# Patient Record
Sex: MALE
[De-identification: names, ages, dates, MRNs, and addresses within clinical notes are randomized per-mention and may not be internally consistent; named-entity substitution may affect disease eponyms.]

---

## 2019-06-05 ENCOUNTER — TRANSCRIPTION ENCOUNTER (OUTPATIENT)
Age: 49
End: 2019-06-05

## 2019-06-05 ENCOUNTER — INPATIENT (INPATIENT)
Facility: HOSPITAL | Age: 49
LOS: 2 days | Discharge: HOME | DRG: 493 | End: 2019-06-08
Attending: SURGERY | Admitting: SURGERY
Payer: COMMERCIAL

## 2019-06-05 VITALS — SYSTOLIC BLOOD PRESSURE: 167 MMHG | HEART RATE: 109 BPM | DIASTOLIC BLOOD PRESSURE: 97 MMHG
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
## 2019-06-05 LAB
ALBUMIN SERPL ELPH-MCNC: 4.5 G/DL — SIGNIFICANT CHANGE UP (ref 3.3–5.2)
ALP SERPL-CCNC: 76 U/L — SIGNIFICANT CHANGE UP (ref 40–120)
ALT FLD-CCNC: 60 U/L — HIGH
AMPHET UR-MCNC: NEGATIVE — SIGNIFICANT CHANGE UP
ANION GAP SERPL CALC-SCNC: 14 MMOL/L — SIGNIFICANT CHANGE UP (ref 5–17)
APPEARANCE UR: CLEAR — SIGNIFICANT CHANGE UP
APTT BLD: 26 SEC — LOW (ref 27.5–36.3)
AST SERPL-CCNC: 46 U/L — HIGH
BARBITURATES UR SCN-MCNC: NEGATIVE — SIGNIFICANT CHANGE UP
BASOPHILS # BLD AUTO: 0.1 K/UL — SIGNIFICANT CHANGE UP (ref 0–0.2)
BASOPHILS NFR BLD AUTO: 0.7 % — SIGNIFICANT CHANGE UP (ref 0–2)
BENZODIAZ UR-MCNC: NEGATIVE — SIGNIFICANT CHANGE UP
BILIRUB SERPL-MCNC: 0.5 MG/DL — SIGNIFICANT CHANGE UP (ref 0.4–2)
BILIRUB UR-MCNC: NEGATIVE — SIGNIFICANT CHANGE UP
BLD GP AB SCN SERPL QL: SIGNIFICANT CHANGE UP
BUN SERPL-MCNC: 14 MG/DL — SIGNIFICANT CHANGE UP (ref 8–20)
CALCIUM SERPL-MCNC: 9.7 MG/DL — SIGNIFICANT CHANGE UP (ref 8.6–10.2)
CHLORIDE SERPL-SCNC: 98 MMOL/L — SIGNIFICANT CHANGE UP (ref 98–107)
CO2 SERPL-SCNC: 25 MMOL/L — SIGNIFICANT CHANGE UP (ref 22–29)
COCAINE METAB.OTHER UR-MCNC: NEGATIVE — SIGNIFICANT CHANGE UP
COLOR SPEC: YELLOW — SIGNIFICANT CHANGE UP
CREAT SERPL-MCNC: 0.94 MG/DL — SIGNIFICANT CHANGE UP (ref 0.5–1.3)
DIFF PNL FLD: NEGATIVE — SIGNIFICANT CHANGE UP
EOSINOPHIL # BLD AUTO: 0.3 K/UL — SIGNIFICANT CHANGE UP (ref 0–0.5)
EOSINOPHIL NFR BLD AUTO: 3.7 % — SIGNIFICANT CHANGE UP (ref 0–5)
ETHANOL SERPL-MCNC: <10 MG/DL — SIGNIFICANT CHANGE UP
GLUCOSE SERPL-MCNC: 140 MG/DL — HIGH (ref 70–115)
GLUCOSE UR QL: NEGATIVE MG/DL — SIGNIFICANT CHANGE UP
HCT VFR BLD CALC: 45.1 % — SIGNIFICANT CHANGE UP (ref 42–52)
HGB BLD-MCNC: 15.5 G/DL — SIGNIFICANT CHANGE UP (ref 14–18)
INR BLD: 0.91 RATIO — SIGNIFICANT CHANGE UP (ref 0.88–1.16)
KETONES UR-MCNC: NEGATIVE — SIGNIFICANT CHANGE UP
LACTATE SERPL-SCNC: 0.9 MMOL/L — SIGNIFICANT CHANGE UP (ref 0.5–2)
LEUKOCYTE ESTERASE UR-ACNC: NEGATIVE — SIGNIFICANT CHANGE UP
LIDOCAIN IGE QN: 611 U/L — HIGH (ref 22–51)
LYMPHOCYTES # BLD AUTO: 2.4 K/UL — SIGNIFICANT CHANGE UP (ref 1–4.8)
LYMPHOCYTES # BLD AUTO: 30.1 % — SIGNIFICANT CHANGE UP (ref 20–55)
MCHC RBC-ENTMCNC: 31.5 PG — HIGH (ref 27–31)
MCHC RBC-ENTMCNC: 34.4 G/DL — SIGNIFICANT CHANGE UP (ref 32–36)
MCV RBC AUTO: 91.7 FL — SIGNIFICANT CHANGE UP (ref 80–94)
METHADONE UR-MCNC: NEGATIVE — SIGNIFICANT CHANGE UP
MONOCYTES # BLD AUTO: 0.5 K/UL — SIGNIFICANT CHANGE UP (ref 0–0.8)
MONOCYTES NFR BLD AUTO: 6.4 % — SIGNIFICANT CHANGE UP (ref 3–10)
NEUTROPHILS # BLD AUTO: 4.7 K/UL — SIGNIFICANT CHANGE UP (ref 1.8–8)
NEUTROPHILS NFR BLD AUTO: 58.4 % — SIGNIFICANT CHANGE UP (ref 37–73)
NITRITE UR-MCNC: NEGATIVE — SIGNIFICANT CHANGE UP
OPIATES UR-MCNC: NEGATIVE — SIGNIFICANT CHANGE UP
PCP SPEC-MCNC: SIGNIFICANT CHANGE UP
PCP UR-MCNC: NEGATIVE — SIGNIFICANT CHANGE UP
PH UR: 8 — SIGNIFICANT CHANGE UP (ref 5–8)
PLATELET # BLD AUTO: 268 K/UL — SIGNIFICANT CHANGE UP (ref 150–400)
POTASSIUM SERPL-MCNC: 4.3 MMOL/L — SIGNIFICANT CHANGE UP (ref 3.5–5.3)
POTASSIUM SERPL-SCNC: 4.3 MMOL/L — SIGNIFICANT CHANGE UP (ref 3.5–5.3)
PROT SERPL-MCNC: 7.8 G/DL — SIGNIFICANT CHANGE UP (ref 6.6–8.7)
PROT UR-MCNC: NEGATIVE MG/DL — SIGNIFICANT CHANGE UP
PROTHROM AB SERPL-ACNC: 10.4 SEC — SIGNIFICANT CHANGE UP (ref 10–12.9)
RBC # BLD: 4.92 M/UL — SIGNIFICANT CHANGE UP (ref 4.6–6.2)
RBC # FLD: 13.6 % — SIGNIFICANT CHANGE UP (ref 11–15.6)
SODIUM SERPL-SCNC: 137 MMOL/L — SIGNIFICANT CHANGE UP (ref 135–145)
SP GR SPEC: 1.01 — SIGNIFICANT CHANGE UP (ref 1.01–1.02)
THC UR QL: POSITIVE
TYPE + AB SCN PNL BLD: SIGNIFICANT CHANGE UP
UROBILINOGEN FLD QL: NEGATIVE MG/DL — SIGNIFICANT CHANGE UP
WBC # BLD: 8.1 K/UL — SIGNIFICANT CHANGE UP (ref 4.8–10.8)
WBC # FLD AUTO: 8.1 K/UL — SIGNIFICANT CHANGE UP (ref 4.8–10.8)

## 2019-06-05 PROCEDURE — 73610 X-RAY EXAM OF ANKLE: CPT | Mod: 26,RT

## 2019-06-05 PROCEDURE — 72170 X-RAY EXAM OF PELVIS: CPT | Mod: 26

## 2019-06-05 PROCEDURE — 73700 CT LOWER EXTREMITY W/O DYE: CPT | Mod: 26,RT

## 2019-06-05 PROCEDURE — 74177 CT ABD & PELVIS W/CONTRAST: CPT | Mod: 26

## 2019-06-05 PROCEDURE — 71260 CT THORAX DX C+: CPT | Mod: 26

## 2019-06-05 PROCEDURE — 71045 X-RAY EXAM CHEST 1 VIEW: CPT | Mod: 26

## 2019-06-05 PROCEDURE — 70450 CT HEAD/BRAIN W/O DYE: CPT | Mod: 26

## 2019-06-05 PROCEDURE — 73200 CT UPPER EXTREMITY W/O DYE: CPT | Mod: 26,LT

## 2019-06-05 PROCEDURE — 72125 CT NECK SPINE W/O DYE: CPT | Mod: 26

## 2019-06-05 PROCEDURE — 76377 3D RENDER W/INTRP POSTPROCES: CPT | Mod: 26

## 2019-06-05 PROCEDURE — 99291 CRITICAL CARE FIRST HOUR: CPT

## 2019-06-05 PROCEDURE — 99053 MED SERV 10PM-8AM 24 HR FAC: CPT

## 2019-06-05 PROCEDURE — 73090 X-RAY EXAM OF FOREARM: CPT | Mod: 26,LT

## 2019-06-05 PROCEDURE — 73130 X-RAY EXAM OF HAND: CPT | Mod: 26,LT

## 2019-06-05 PROCEDURE — 73060 X-RAY EXAM OF HUMERUS: CPT | Mod: 26,LT

## 2019-06-05 PROCEDURE — 73120 X-RAY EXAM OF HAND: CPT | Mod: 26,59,LT

## 2019-06-05 PROCEDURE — 73030 X-RAY EXAM OF SHOULDER: CPT | Mod: 26,LT

## 2019-06-05 PROCEDURE — 73110 X-RAY EXAM OF WRIST: CPT | Mod: 26,76,LT

## 2019-06-05 PROCEDURE — 73590 X-RAY EXAM OF LOWER LEG: CPT | Mod: 26,RT

## 2019-06-05 RX ORDER — HYDROMORPHONE HYDROCHLORIDE 2 MG/ML
0.5 INJECTION INTRAMUSCULAR; INTRAVENOUS; SUBCUTANEOUS EVERY 4 HOURS
Refills: 0 | Status: DISCONTINUED | OUTPATIENT
Start: 2019-06-05 | End: 2019-06-06

## 2019-06-05 RX ORDER — SODIUM CHLORIDE 9 MG/ML
1000 INJECTION, SOLUTION INTRAVENOUS ONCE
Refills: 0 | Status: COMPLETED | OUTPATIENT
Start: 2019-06-05 | End: 2019-06-05

## 2019-06-05 RX ORDER — HYDROMORPHONE HYDROCHLORIDE 2 MG/ML
1 INJECTION INTRAMUSCULAR; INTRAVENOUS; SUBCUTANEOUS ONCE
Refills: 0 | Status: DISCONTINUED | OUTPATIENT
Start: 2019-06-05 | End: 2019-06-05

## 2019-06-05 RX ORDER — HYDROMORPHONE HYDROCHLORIDE 2 MG/ML
0.5 INJECTION INTRAMUSCULAR; INTRAVENOUS; SUBCUTANEOUS ONCE
Refills: 0 | Status: DISCONTINUED | OUTPATIENT
Start: 2019-06-05 | End: 2019-06-05

## 2019-06-05 RX ORDER — HYDROMORPHONE HYDROCHLORIDE 2 MG/ML
1 INJECTION INTRAMUSCULAR; INTRAVENOUS; SUBCUTANEOUS EVERY 4 HOURS
Refills: 0 | Status: DISCONTINUED | OUTPATIENT
Start: 2019-06-05 | End: 2019-06-06

## 2019-06-05 RX ORDER — CEFAZOLIN SODIUM 1 G
2000 VIAL (EA) INJECTION ONCE
Refills: 0 | Status: COMPLETED | OUTPATIENT
Start: 2019-06-05 | End: 2019-06-05

## 2019-06-05 RX ORDER — CEFAZOLIN SODIUM 1 G
VIAL (EA) INJECTION
Refills: 0 | Status: DISCONTINUED | OUTPATIENT
Start: 2019-06-05 | End: 2019-06-06

## 2019-06-05 RX ORDER — ACETAMINOPHEN 500 MG
1000 TABLET ORAL ONCE
Refills: 0 | Status: COMPLETED | OUTPATIENT
Start: 2019-06-05 | End: 2019-06-05

## 2019-06-05 RX ORDER — SODIUM CHLORIDE 9 MG/ML
1000 INJECTION, SOLUTION INTRAVENOUS
Refills: 0 | Status: DISCONTINUED | OUTPATIENT
Start: 2019-06-05 | End: 2019-06-06

## 2019-06-05 RX ORDER — CEFAZOLIN SODIUM 1 G
2000 VIAL (EA) INJECTION EVERY 8 HOURS
Refills: 0 | Status: DISCONTINUED | OUTPATIENT
Start: 2019-06-05 | End: 2019-06-06

## 2019-06-05 RX ORDER — TETANUS TOXOID, REDUCED DIPHTHERIA TOXOID AND ACELLULAR PERTUSSIS VACCINE, ADSORBED 5; 2.5; 8; 8; 2.5 [IU]/.5ML; [IU]/.5ML; UG/.5ML; UG/.5ML; UG/.5ML
0.5 SUSPENSION INTRAMUSCULAR ONCE
Refills: 0 | Status: COMPLETED | OUTPATIENT
Start: 2019-06-05 | End: 2019-06-05

## 2019-06-05 RX ORDER — FENTANYL CITRATE 50 UG/ML
75 INJECTION INTRAVENOUS ONCE
Refills: 0 | Status: DISCONTINUED | OUTPATIENT
Start: 2019-06-05 | End: 2019-06-05

## 2019-06-05 RX ADMIN — Medication 100 MILLIGRAM(S): at 13:41

## 2019-06-05 RX ADMIN — Medication 100 MILLIGRAM(S): at 21:57

## 2019-06-05 RX ADMIN — TETANUS TOXOID, REDUCED DIPHTHERIA TOXOID AND ACELLULAR PERTUSSIS VACCINE, ADSORBED 0.5 MILLILITER(S): 5; 2.5; 8; 8; 2.5 SUSPENSION INTRAMUSCULAR at 08:30

## 2019-06-05 RX ADMIN — HYDROMORPHONE HYDROCHLORIDE 0.5 MILLIGRAM(S): 2 INJECTION INTRAMUSCULAR; INTRAVENOUS; SUBCUTANEOUS at 19:41

## 2019-06-05 RX ADMIN — FENTANYL CITRATE 75 MICROGRAM(S): 50 INJECTION INTRAVENOUS at 08:00

## 2019-06-05 RX ADMIN — HYDROMORPHONE HYDROCHLORIDE 1 MILLIGRAM(S): 2 INJECTION INTRAMUSCULAR; INTRAVENOUS; SUBCUTANEOUS at 10:07

## 2019-06-05 RX ADMIN — FENTANYL CITRATE 75 MICROGRAM(S): 50 INJECTION INTRAVENOUS at 07:33

## 2019-06-05 RX ADMIN — HYDROMORPHONE HYDROCHLORIDE 1 MILLIGRAM(S): 2 INJECTION INTRAMUSCULAR; INTRAVENOUS; SUBCUTANEOUS at 23:47

## 2019-06-05 RX ADMIN — HYDROMORPHONE HYDROCHLORIDE 0.5 MILLIGRAM(S): 2 INJECTION INTRAMUSCULAR; INTRAVENOUS; SUBCUTANEOUS at 15:30

## 2019-06-05 RX ADMIN — HYDROMORPHONE HYDROCHLORIDE 0.5 MILLIGRAM(S): 2 INJECTION INTRAMUSCULAR; INTRAVENOUS; SUBCUTANEOUS at 16:40

## 2019-06-05 RX ADMIN — Medication 1000 MILLIGRAM(S): at 12:46

## 2019-06-05 RX ADMIN — HYDROMORPHONE HYDROCHLORIDE 0.5 MILLIGRAM(S): 2 INJECTION INTRAMUSCULAR; INTRAVENOUS; SUBCUTANEOUS at 13:40

## 2019-06-05 RX ADMIN — HYDROMORPHONE HYDROCHLORIDE 1 MILLIGRAM(S): 2 INJECTION INTRAMUSCULAR; INTRAVENOUS; SUBCUTANEOUS at 08:41

## 2019-06-05 RX ADMIN — HYDROMORPHONE HYDROCHLORIDE 0.5 MILLIGRAM(S): 2 INJECTION INTRAMUSCULAR; INTRAVENOUS; SUBCUTANEOUS at 10:20

## 2019-06-05 RX ADMIN — SODIUM CHLORIDE 1000 MILLILITER(S): 9 INJECTION, SOLUTION INTRAVENOUS at 08:25

## 2019-06-05 RX ADMIN — SODIUM CHLORIDE 125 MILLILITER(S): 9 INJECTION, SOLUTION INTRAVENOUS at 21:58

## 2019-06-05 RX ADMIN — HYDROMORPHONE HYDROCHLORIDE 0.5 MILLIGRAM(S): 2 INJECTION INTRAMUSCULAR; INTRAVENOUS; SUBCUTANEOUS at 10:08

## 2019-06-05 RX ADMIN — HYDROMORPHONE HYDROCHLORIDE 0.5 MILLIGRAM(S): 2 INJECTION INTRAMUSCULAR; INTRAVENOUS; SUBCUTANEOUS at 19:20

## 2019-06-05 RX ADMIN — Medication 400 MILLIGRAM(S): at 10:20

## 2019-06-05 RX ADMIN — HYDROMORPHONE HYDROCHLORIDE 1 MILLIGRAM(S): 2 INJECTION INTRAMUSCULAR; INTRAVENOUS; SUBCUTANEOUS at 08:02

## 2019-06-05 RX ADMIN — SODIUM CHLORIDE 1000 MILLILITER(S): 9 INJECTION, SOLUTION INTRAVENOUS at 07:35

## 2019-06-05 RX ADMIN — HYDROMORPHONE HYDROCHLORIDE 1 MILLIGRAM(S): 2 INJECTION INTRAMUSCULAR; INTRAVENOUS; SUBCUTANEOUS at 08:40

## 2019-06-05 RX ADMIN — SODIUM CHLORIDE 125 MILLILITER(S): 9 INJECTION, SOLUTION INTRAVENOUS at 10:09
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
## 2019-06-05 NOTE — CHART NOTE - NSCHARTNOTEFT_GEN_A_CORE
Patient is cleared for surgery as per trauma team.  Ortho may take patient to surgery, and he is preopped.
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
## 2019-06-05 NOTE — ED PROVIDER NOTE - MUSCULOSKELETAL MINIMAL EXAM
Tenderness to lower cervical spine and midline spine; visible deformity to right ankle and left wrist
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
## 2019-06-05 NOTE — H&P ADULT - NSHPPHYSICALEXAM_GEN_ALL_CORE
Constitutional: Well-developed well nourished Male   HEENT: Head is normocephalic and atraumatic, maxillofacial structures stable, no blood or discharge from nares or oral cavity, no angel sign / racoon eyes, EOMI b/l, pupils [2 ]mm round and reactive to light b/l, no active drainage or redness  Neck: cervical collar in place, trachea midline  Respiratory: Breath sounds CTA b/l respirations are unlabored, no accessory muscle use, no conversational dyspnea  Cardiovascular: Regular rate & rhythm, +S1, S2  Chest: Chest wall is non-tender to palpation, no subQ emphysema or crepitus palpated  Gastrointestinal: Abdomen soft, non-tender, non-distended, no rebound tenderness / guarding, there is a superficial abrasion to the LUQ  Extremities: left wrist deformity and TTP, left middle finger laceration. sensory and motor fx intact to LUE with 2+ radial pulses palpated.  left mid thigh abrasion  LLE no TTP or deformities. sensory / motor fx intact. palpable 2+ DP and PT pulses  RUE pulses, sensory/motor fx intact. no deformities or TTP  R ankle deformity and TTP. sensory/motor fx intact. PT/DP pulses palpable 2+  Vascular: 2+ radial, femoral, and DP pulses b/l  Neurological: GCS: 15 (4/5/6). A&O x 3; no gross sensory / motor / coordination deficits  Musculoskeletal: 5/5 strength UE and LE  Back: + lower cervical spine TTP. no /T/LS spine tenderness to palpation, no step-offs or signs of external trauma to the back
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
## 2019-06-05 NOTE — PROGRESS NOTE ADULT - SUBJECTIVE AND OBJECTIVE BOX
All CTs are negative for acute traumatic injury, including cervical spine. Pt has no distracting injuries at this time, no changes in strength or sensation, clinically sober, and no pain with confrontational exam. Cervical collar removed. RN aware.
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
## 2019-06-05 NOTE — ED PROVIDER NOTE - CRITICAL CARE PROVIDED
direct patient care (not related to procedure)/additional history taking/interpretation of diagnostic studies/documentation/35 MINUTES/consultation with other physicians

## 2019-06-06 ENCOUNTER — TRANSCRIPTION ENCOUNTER (OUTPATIENT)
Age: 49
End: 2019-06-06

## 2019-06-06 LAB
ABO RH CONFIRMATION: SIGNIFICANT CHANGE UP
ANION GAP SERPL CALC-SCNC: 11 MMOL/L — SIGNIFICANT CHANGE UP (ref 5–17)
BUN SERPL-MCNC: 7 MG/DL — LOW (ref 8–20)
CALCIUM SERPL-MCNC: 8.6 MG/DL — SIGNIFICANT CHANGE UP (ref 8.6–10.2)
CHLORIDE SERPL-SCNC: 101 MMOL/L — SIGNIFICANT CHANGE UP (ref 98–107)
CO2 SERPL-SCNC: 25 MMOL/L — SIGNIFICANT CHANGE UP (ref 22–29)
CREAT SERPL-MCNC: 0.79 MG/DL — SIGNIFICANT CHANGE UP (ref 0.5–1.3)
GLUCOSE SERPL-MCNC: 114 MG/DL — SIGNIFICANT CHANGE UP (ref 70–115)
HCT VFR BLD CALC: 41.9 % — LOW (ref 42–52)
HGB BLD-MCNC: 14.1 G/DL — SIGNIFICANT CHANGE UP (ref 14–18)
INR BLD: 1.03 RATIO — SIGNIFICANT CHANGE UP (ref 0.88–1.16)
MAGNESIUM SERPL-MCNC: 1.9 MG/DL — SIGNIFICANT CHANGE UP (ref 1.6–2.6)
MCHC RBC-ENTMCNC: 31.1 PG — HIGH (ref 27–31)
MCHC RBC-ENTMCNC: 33.7 G/DL — SIGNIFICANT CHANGE UP (ref 32–36)
MCV RBC AUTO: 92.3 FL — SIGNIFICANT CHANGE UP (ref 80–94)
PHOSPHATE SERPL-MCNC: 3 MG/DL — SIGNIFICANT CHANGE UP (ref 2.4–4.7)
PLATELET # BLD AUTO: 210 K/UL — SIGNIFICANT CHANGE UP (ref 150–400)
POTASSIUM SERPL-MCNC: 3.8 MMOL/L — SIGNIFICANT CHANGE UP (ref 3.5–5.3)
POTASSIUM SERPL-SCNC: 3.8 MMOL/L — SIGNIFICANT CHANGE UP (ref 3.5–5.3)
PROTHROM AB SERPL-ACNC: 11.8 SEC — SIGNIFICANT CHANGE UP (ref 10–12.9)
RBC # BLD: 4.54 M/UL — LOW (ref 4.6–6.2)
RBC # FLD: 13.4 % — SIGNIFICANT CHANGE UP (ref 11–15.6)
SODIUM SERPL-SCNC: 137 MMOL/L — SIGNIFICANT CHANGE UP (ref 135–145)
WBC # BLD: 8.2 K/UL — SIGNIFICANT CHANGE UP (ref 4.8–10.8)
WBC # FLD AUTO: 8.2 K/UL — SIGNIFICANT CHANGE UP (ref 4.8–10.8)

## 2019-06-06 PROCEDURE — 11760 REPAIR OF NAIL BED: CPT | Mod: F2

## 2019-06-06 PROCEDURE — 11012 DEB SKIN BONE AT FX SITE: CPT | Mod: F2

## 2019-06-06 PROCEDURE — 26418 REPAIR FINGER TENDON: CPT | Mod: F2

## 2019-06-06 PROCEDURE — 27814 TREATMENT OF ANKLE FRACTURE: CPT | Mod: RT

## 2019-06-06 PROCEDURE — 76000 FLUOROSCOPY <1 HR PHYS/QHP: CPT | Mod: 26

## 2019-06-06 PROCEDURE — 64721 CARPAL TUNNEL SURGERY: CPT | Mod: LT

## 2019-06-06 PROCEDURE — 77071 MNL APPL STRS JT RADIOGRAPHY: CPT | Mod: RT

## 2019-06-06 PROCEDURE — 27610 EXPLORE/TREAT ANKLE JOINT: CPT | Mod: RT

## 2019-06-06 PROCEDURE — 99231 SBSQ HOSP IP/OBS SF/LOW 25: CPT

## 2019-06-06 PROCEDURE — 25609 OPTX DST RD XART FX/EP SEP3+: CPT | Mod: LT

## 2019-06-06 RX ORDER — ACETAMINOPHEN 500 MG
650 TABLET ORAL EVERY 6 HOURS
Refills: 0 | Status: DISCONTINUED | OUTPATIENT
Start: 2019-06-06 | End: 2019-06-08

## 2019-06-06 RX ORDER — POLYETHYLENE GLYCOL 3350 17 G/17G
17 POWDER, FOR SOLUTION ORAL DAILY
Refills: 0 | Status: DISCONTINUED | OUTPATIENT
Start: 2019-06-06 | End: 2019-06-08

## 2019-06-06 RX ORDER — SENNA PLUS 8.6 MG/1
2 TABLET ORAL AT BEDTIME
Refills: 0 | Status: DISCONTINUED | OUTPATIENT
Start: 2019-06-06 | End: 2019-06-08

## 2019-06-06 RX ORDER — FENTANYL CITRATE 50 UG/ML
50 INJECTION INTRAVENOUS
Refills: 0 | Status: DISCONTINUED | OUTPATIENT
Start: 2019-06-06 | End: 2019-06-06

## 2019-06-06 RX ORDER — ACETAMINOPHEN 500 MG
1000 TABLET ORAL ONCE
Refills: 0 | Status: DISCONTINUED | OUTPATIENT
Start: 2019-06-06 | End: 2019-06-07

## 2019-06-06 RX ORDER — HYDROMORPHONE HYDROCHLORIDE 2 MG/ML
0.5 INJECTION INTRAMUSCULAR; INTRAVENOUS; SUBCUTANEOUS EVERY 4 HOURS
Refills: 0 | Status: DISCONTINUED | OUTPATIENT
Start: 2019-06-06 | End: 2019-06-07

## 2019-06-06 RX ORDER — ONDANSETRON 8 MG/1
4 TABLET, FILM COATED ORAL ONCE
Refills: 0 | Status: DISCONTINUED | OUTPATIENT
Start: 2019-06-06 | End: 2019-06-06

## 2019-06-06 RX ORDER — SENNA PLUS 8.6 MG/1
1 TABLET ORAL AT BEDTIME
Refills: 0 | Status: DISCONTINUED | OUTPATIENT
Start: 2019-06-06 | End: 2019-06-08

## 2019-06-06 RX ORDER — DOCUSATE SODIUM 100 MG
100 CAPSULE ORAL
Refills: 0 | Status: DISCONTINUED | OUTPATIENT
Start: 2019-06-06 | End: 2019-06-07

## 2019-06-06 RX ORDER — OXYCODONE HYDROCHLORIDE 5 MG/1
10 TABLET ORAL EVERY 4 HOURS
Refills: 0 | Status: DISCONTINUED | OUTPATIENT
Start: 2019-06-06 | End: 2019-06-08

## 2019-06-06 RX ORDER — ONDANSETRON 8 MG/1
4 TABLET, FILM COATED ORAL EVERY 6 HOURS
Refills: 0 | Status: DISCONTINUED | OUTPATIENT
Start: 2019-06-06 | End: 2019-06-08

## 2019-06-06 RX ORDER — SODIUM CHLORIDE 9 MG/ML
1000 INJECTION, SOLUTION INTRAVENOUS
Refills: 0 | Status: DISCONTINUED | OUTPATIENT
Start: 2019-06-06 | End: 2019-06-06

## 2019-06-06 RX ORDER — HYDROMORPHONE HYDROCHLORIDE 2 MG/ML
1 INJECTION INTRAMUSCULAR; INTRAVENOUS; SUBCUTANEOUS EVERY 4 HOURS
Refills: 0 | Status: DISCONTINUED | OUTPATIENT
Start: 2019-06-06 | End: 2019-06-07

## 2019-06-06 RX ORDER — MAGNESIUM HYDROXIDE 400 MG/1
30 TABLET, CHEWABLE ORAL DAILY
Refills: 0 | Status: DISCONTINUED | OUTPATIENT
Start: 2019-06-06 | End: 2019-06-08

## 2019-06-06 RX ORDER — HYDROMORPHONE HYDROCHLORIDE 2 MG/ML
1 INJECTION INTRAMUSCULAR; INTRAVENOUS; SUBCUTANEOUS
Refills: 0 | Status: DISCONTINUED | OUTPATIENT
Start: 2019-06-06 | End: 2019-06-06

## 2019-06-06 RX ORDER — DOCUSATE SODIUM 100 MG
100 CAPSULE ORAL THREE TIMES A DAY
Refills: 0 | Status: DISCONTINUED | OUTPATIENT
Start: 2019-06-06 | End: 2019-06-08

## 2019-06-06 RX ORDER — OXYCODONE HYDROCHLORIDE 5 MG/1
5 TABLET ORAL EVERY 4 HOURS
Refills: 0 | Status: DISCONTINUED | OUTPATIENT
Start: 2019-06-06 | End: 2019-06-08

## 2019-06-06 RX ADMIN — HYDROMORPHONE HYDROCHLORIDE 1 MILLIGRAM(S): 2 INJECTION INTRAMUSCULAR; INTRAVENOUS; SUBCUTANEOUS at 20:46

## 2019-06-06 RX ADMIN — HYDROMORPHONE HYDROCHLORIDE 0.5 MILLIGRAM(S): 2 INJECTION INTRAMUSCULAR; INTRAVENOUS; SUBCUTANEOUS at 22:36

## 2019-06-06 RX ADMIN — HYDROMORPHONE HYDROCHLORIDE 0.5 MILLIGRAM(S): 2 INJECTION INTRAMUSCULAR; INTRAVENOUS; SUBCUTANEOUS at 22:22

## 2019-06-06 RX ADMIN — Medication 100 MILLIGRAM(S): at 06:32

## 2019-06-06 RX ADMIN — HYDROMORPHONE HYDROCHLORIDE 1 MILLIGRAM(S): 2 INJECTION INTRAMUSCULAR; INTRAVENOUS; SUBCUTANEOUS at 09:05

## 2019-06-06 RX ADMIN — HYDROMORPHONE HYDROCHLORIDE 1 MILLIGRAM(S): 2 INJECTION INTRAMUSCULAR; INTRAVENOUS; SUBCUTANEOUS at 18:19

## 2019-06-06 RX ADMIN — Medication 100 MILLIGRAM(S): at 22:21

## 2019-06-06 RX ADMIN — HYDROMORPHONE HYDROCHLORIDE 1 MILLIGRAM(S): 2 INJECTION INTRAMUSCULAR; INTRAVENOUS; SUBCUTANEOUS at 00:00

## 2019-06-06 RX ADMIN — HYDROMORPHONE HYDROCHLORIDE 1 MILLIGRAM(S): 2 INJECTION INTRAMUSCULAR; INTRAVENOUS; SUBCUTANEOUS at 08:35

## 2019-06-06 RX ADMIN — HYDROMORPHONE HYDROCHLORIDE 1 MILLIGRAM(S): 2 INJECTION INTRAMUSCULAR; INTRAVENOUS; SUBCUTANEOUS at 03:54

## 2019-06-06 RX ADMIN — HYDROMORPHONE HYDROCHLORIDE 1 MILLIGRAM(S): 2 INJECTION INTRAMUSCULAR; INTRAVENOUS; SUBCUTANEOUS at 04:05

## 2019-06-06 RX ADMIN — HYDROMORPHONE HYDROCHLORIDE 1 MILLIGRAM(S): 2 INJECTION INTRAMUSCULAR; INTRAVENOUS; SUBCUTANEOUS at 21:00

## 2019-06-06 RX ADMIN — HYDROMORPHONE HYDROCHLORIDE 1 MILLIGRAM(S): 2 INJECTION INTRAMUSCULAR; INTRAVENOUS; SUBCUTANEOUS at 18:29
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
## 2019-06-06 NOTE — PROGRESS NOTE ADULT - SUBJECTIVE AND OBJECTIVE BOX
ORTHO-POST-OP PROGRESS NOTE:      [id number]    ESPINOZA OWEN      PROCEDURE: Left wrist ORIF, Right ankle ORIF, I&D Left 3rd DIP, carpal tunnel release, tendon/nail bed repair  Surgeon: Dr. Sanchez/Dr. Avila  DOS: 6/6/19     Patient seen and examined. Patient doing well. Complaining of Left 4th and 5th finger numbness/tingling and generalized pain in left wrist. No other complaints. Tolerating PO fluids and diet well. He has voided postoperatively.                              14.1   8.2   )-----------( 210      ( 06 Jun 2019 06:31 )             41.9       I&O's Detail    05 Jun 2019 07:01  -  06 Jun 2019 07:00  --------------------------------------------------------  IN:  Total IN: 0 mL    OUT:    Voided: 2000 mL  Total OUT: 2000 mL    Total NET: -2000 mL      06 Jun 2019 07:01  -  06 Jun 2019 22:15  --------------------------------------------------------  IN:  Total IN: 0 mL    OUT:    Voided: 200 mL  Total OUT: 200 mL    Total NET: -200 mL    acetaminophen   Tablet .. 650 milliGRAM(s) Oral every 6 hours PRN  acetaminophen  IVPB .. 1000 milliGRAM(s) IV Intermittent once  aluminum hydroxide/magnesium hydroxide/simethicone Suspension 30 milliLiter(s) Oral four times a day PRN  clindamycin IVPB 600 milliGRAM(s) IV Intermittent every 8 hours  docusate sodium 100 milliGRAM(s) Oral two times a day  docusate sodium 100 milliGRAM(s) Oral three times a day  HYDROmorphone  Injectable 0.5 milliGRAM(s) IV Push every 4 hours PRN  HYDROmorphone  Injectable 0.5 milliGRAM(s) IV Push every 4 hours PRN  HYDROmorphone  Injectable 1 milliGRAM(s) IV Push every 4 hours PRN  magnesium hydroxide Suspension 30 milliLiter(s) Oral daily PRN  ondansetron Injectable 4 milliGRAM(s) IV Push every 6 hours PRN  oxyCODONE    IR 5 milliGRAM(s) Oral every 4 hours PRN  oxyCODONE    IR 10 milliGRAM(s) Oral every 4 hours PRN  polyethylene glycol 3350 17 Gram(s) Oral daily  senna 1 Tablet(s) Oral at bedtime  senna 2 Tablet(s) Oral at bedtime PRN      T(C): 37.2 (06-06-19 @ 19:44), Max: 38.1 (06-05-19 @ 22:29)  HR: 103 (06-06-19 @ 19:44) (96 - 111)  BP: 156/95 (06-06-19 @ 19:44) (130/101 - 182/101)  RR: 18 (06-06-19 @ 19:44) (15 - 20)  SpO2: 97% (06-06-19 @ 19:44) (96% - 100%)  Wt(kg): --      PHYSICAL EXAM:     Constitutional: Alert, responsive, in no acute distress.     Left upper Extremity: Extremity in elevation pillow, well padded volar splint in place with 3rd finger held in flexion. Sensation to 4th/5th finger slightly diminished distally. Fingers warm, cap refill less than 2 seconds. Splint loosened slightly and rewrapped and placed back in elevation pillow. FROM of all digits distally. Radial pulse palpable, 2+. BCR.     Right lower extremity: Well padded splint in place. Sensation to light touch grossly intact to skin that is exposed. Moving toes distally. Capillary refill is less than 2 seconds.     < from: Xray Ankle Post Reduction, Right (06.05.19 @ 12:20) >     EXAM:  XR ANKLE POST RED 2 VIEWS RT                          PROCEDURE DATE:  06/05/2019          INTERPRETATION:  RIGHT ANKLE X-RAY:    History: Post reduction.    Date and time of exam: 6/5/2019 11:58 AM.    Three views were obtained.    Findings: Again noted are fractures of the distal fibula and distal tibia   with widening of the ankle joint. A cast has been applied to the lower   extremity..    Impression:  Post reduction radiographs..    < end of copied text >    < from: Xray Hand Post Reduction, Left (06.05.19 @ 12:19) >   EXAM:  XR HAND POST RED 2 VIEWS LT                          PROCEDURE DATE:  06/05/2019          INTERPRETATION:  X-RAY LEFT HAND:    History: Post reduction left third finger fracture.    Date and time of exam: 6/5/2019 12:04 PM.    Technique: 3 views were obtained.    Findings: A splint has been applied to the left third finger. Again noted   is a comminuted oblique fracture involving the distal phalanx of the left   third finger. Alignment is now essentially anatomic. Alignment at the   distal interphalangeal joint is anatomic. No additional fractures noted   involving the hand. Again noted are fractures of the wrist..    Impression:  Post reduction radiographs of the left third finger..    < end of copied text >                                                            A/P :  48 y/o Male S/P Left wrist ORIF/Right ankle ORIF, I&D of 3rd DIP, left carpal tunnel release, and 3rd finger tendon/nail bed repair   POD# 0    -  Pain control  -  DVT ppx: As per primary team   -  Physical therapy  -  Weight bearing status: Non-weight bearing of Left upper extremity/ Non-weight bearing as Right lower extremity  -  Postop abx: clinda. Keflex to begin tomorrow for 10 days postoperatively  -  elevate RLE and LUE.  -  Discharge planning as per primary team  -

## 2019-06-06 NOTE — BRIEF OPERATIVE NOTE - NSICDXBRIEFPROCEDURE_GEN_ALL_CORE_FT
PROCEDURES:  Simple repair of superficial wound of hand, 2.5cm or less 06-Jun-2019 21:43:07 left middle finger Lis Avila  Repair of nail bed 06-Jun-2019 21:41:04 left middle finger Lis Avila  Repair of extensor tendon of single finger 06-Jun-2019 21:40:12 left middle finger Lis Avila  Debridement, fascia, muscle, or bone, for open fracture or dislocation 06-Jun-2019 21:39:06 left middle finger DIP joint Lis Avila  Carpal tunnel release 06-Jun-2019 21:33:45  Lis Avila  Open reduction and internal fixation of 3 or more fragments of distal radius 06-Jun-2019 21:32:31 left distal radius Lis Avila
PROCEDURES:  XR ankle, stress view 06-Jun-2019 14:32:54  Jeyson Sanchez  Arthrotomy of right ankle 06-Jun-2019 14:32:38  Jeyson Sanchez  ORIF, fracture, ankle, bimalleolar 06-Jun-2019 14:32:00  Jeyson Sanchez

## 2019-06-06 NOTE — PROGRESS NOTE ADULT - SUBJECTIVE AND OBJECTIVE BOX
INTERVAL HPI/OVERNIGHT EVENTS/SUBJECTIVE:  Patient doing well. Awaiting OR for ortho for write and ankle. No acute issues overnight. pain controlled. Cleared. for surgery.     Vital Signs Last 24 Hrs  T(C): 37.4 (2019 11:05), Max: 38.1 (2019 22:29)  T(F): 99.3 (2019 11:05), Max: 100.5 (2019 22:29)  HR: 106 (2019 11:05) (96 - 111)  BP: 171/92 (2019 11:05) (152/92 - 171/92)  BP(mean): --  ABP: --  ABP(mean): --  RR: 18 (2019 11:05) (18 - 20)  SpO2: 97% (2019 11:05) (96% - 98%)      I&O's Detail    2019 07:01  -  2019 07:00  --------------------------------------------------------  IN:  Total IN: 0 mL    OUT:    Voided: 2000 mL  Total OUT: 2000 mL    Total NET: -2000 mL                MEDICATIONS  (STANDING):  ceFAZolin   IVPB      ceFAZolin   IVPB 2000 milliGRAM(s) IV Intermittent every 8 hours  lactated ringers. 1000 milliLiter(s) (125 mL/Hr) IV Continuous <Continuous>    MEDICATIONS  (PRN):  HYDROmorphone  Injectable 0.5 milliGRAM(s) IV Push every 4 hours PRN Moderate Pain (4 - 6)  HYDROmorphone  Injectable 1 milliGRAM(s) IV Push every 4 hours PRN Severe Pain (7 - 10)      NUTRITION/IVF: NPO for OR    PHYSICAL EXAM:    Gen: well appearing male in and    Eyes: EOMI    Neurological: A&Ox3, no focal deficits    Pulmonary: unlabored    Gastrointestinal: soft nt nd    Genitourinary: voiding    Extremities: effected extremities are splinted (R ankle, L Distal radius). Pulses present    LABS:  CBC Full  -  ( 2019 06:31 )  WBC Count : 8.2 K/uL  RBC Count : 4.54 M/uL  Hemoglobin : 14.1 g/dL  Hematocrit : 41.9 %  Platelet Count - Automated : 210 K/uL  Mean Cell Volume : 92.3 fl  Mean Cell Hemoglobin : 31.1 pg  Mean Cell Hemoglobin Concentration : 33.7 g/dL  Auto Neutrophil # : x  Auto Lymphocyte # : x  Auto Monocyte # : x  Auto Eosinophil # : x  Auto Basophil # : x  Auto Neutrophil % : x  Auto Lymphocyte % : x  Auto Monocyte % : x  Auto Eosinophil % : x  Auto Basophil % : x    -    137  |  101  |  7.0<L>  ----------------------------<  114  3.8   |  25.0  |  0.79    Ca    8.6      2019 06:31  Phos  3.0     -  Mg     1.9     -    TPro  7.8  /  Alb  4.5  /  TBili  0.5  /  DBili  x   /  AST  46<H>  /  ALT  60<H>  /  AlkPhos  76  06-05    PT/INR - ( 2019 06:31 )   PT: 11.8 sec;   INR: 1.03 ratio         PTT - ( 2019 07:39 )  PTT:26.0 sec  Urinalysis Basic - ( 2019 14:40 )    Color: Yellow / Appearance: Clear / S.010 / pH: x  Gluc: x / Ketone: Negative  / Bili: Negative / Urobili: Negative mg/dL   Blood: x / Protein: Negative mg/dL / Nitrite: Negative   Leuk Esterase: Negative / RBC: x / WBC x   Sq Epi: x / Non Sq Epi: x / Bacteria: x      RECENT CULTURES:      LIVER FUNCTIONS - ( 2019 07:39 )  Alb: 4.5 g/dL / Pro: 7.8 g/dL / ALK PHOS: 76 U/L / ALT: 60 U/L / AST: 46 U/L / GGT: x               CAPILLARY BLOOD GLUCOSE      RADIOLOGY & ADDITIONAL STUDIES:    ASSESSMENT/PLAN:  49yMale presenting with R Bimalleolar fx, L Distal radius fx, L 3rd distal phalynx fx  - To OR with Ortho for wrist and ankle repair this morning  -post op will need PT/OT and rehab consults  -DVT proph  -adequate pain control post op  -spirometry  -diet post operatively  -dispo planning 24-48 hours post op.

## 2019-06-06 NOTE — BRIEF OPERATIVE NOTE - NSICDXBRIEFPOSTOP_GEN_ALL_CORE_FT
POST-OP DIAGNOSIS:  Laceration of extensor muscle, fascia, or tendon of left middle finger at wrist or hand level 06-Jun-2019 22:02:31  Lis Avila  Traumatic rupture of ligament of other finger at metacarpophalangeal and interphalangeal joint 06-Jun-2019 21:59:45 left middle Lis Avila  Laceration of middle finger with damage to nail 06-Jun-2019 21:59:01 left Lis Avila  Volar Arredondo's fracture of left radius 06-Jun-2019 21:58:19  Lis Avila
POST-OP DIAGNOSIS:  Bimalleolar fracture of right ankle 06-Jun-2019 14:33:22  Jeyson Sanchez

## 2019-06-06 NOTE — BRIEF OPERATIVE NOTE - NSICDXBRIEFPREOP_GEN_ALL_CORE_FT
PRE-OP DIAGNOSIS:  Laceration of middle finger with damage to nail 06-Jun-2019 21:53:25 left Lis Avila  Volar Arredondo's fracture of left radius 06-Jun-2019 21:51:00  Lis Avila
PRE-OP DIAGNOSIS:  Bimalleolar fracture of right ankle 06-Jun-2019 14:33:12  Jeyson Sanchez

## 2019-06-07 LAB
ANION GAP SERPL CALC-SCNC: 10 MMOL/L — SIGNIFICANT CHANGE UP (ref 5–17)
BASOPHILS # BLD AUTO: 0 K/UL — SIGNIFICANT CHANGE UP (ref 0–0.2)
BASOPHILS NFR BLD AUTO: 0.2 % — SIGNIFICANT CHANGE UP (ref 0–2)
BUN SERPL-MCNC: 10 MG/DL — SIGNIFICANT CHANGE UP (ref 8–20)
CALCIUM SERPL-MCNC: 8.5 MG/DL — LOW (ref 8.6–10.2)
CHLORIDE SERPL-SCNC: 100 MMOL/L — SIGNIFICANT CHANGE UP (ref 98–107)
CO2 SERPL-SCNC: 26 MMOL/L — SIGNIFICANT CHANGE UP (ref 22–29)
CREAT SERPL-MCNC: 0.8 MG/DL — SIGNIFICANT CHANGE UP (ref 0.5–1.3)
EOSINOPHIL # BLD AUTO: 0.1 K/UL — SIGNIFICANT CHANGE UP (ref 0–0.5)
EOSINOPHIL NFR BLD AUTO: 0.6 % — SIGNIFICANT CHANGE UP (ref 0–5)
GLUCOSE SERPL-MCNC: 112 MG/DL — SIGNIFICANT CHANGE UP (ref 70–115)
HCT VFR BLD CALC: 39.2 % — LOW (ref 42–52)
HGB BLD-MCNC: 12.9 G/DL — LOW (ref 14–18)
LYMPHOCYTES # BLD AUTO: 1.2 K/UL — SIGNIFICANT CHANGE UP (ref 1–4.8)
LYMPHOCYTES # BLD AUTO: 11.7 % — LOW (ref 20–55)
MAGNESIUM SERPL-MCNC: 2.1 MG/DL — SIGNIFICANT CHANGE UP (ref 1.6–2.6)
MCHC RBC-ENTMCNC: 30.6 PG — SIGNIFICANT CHANGE UP (ref 27–31)
MCHC RBC-ENTMCNC: 32.9 G/DL — SIGNIFICANT CHANGE UP (ref 32–36)
MCV RBC AUTO: 93.1 FL — SIGNIFICANT CHANGE UP (ref 80–94)
MONOCYTES # BLD AUTO: 1.3 K/UL — HIGH (ref 0–0.8)
MONOCYTES NFR BLD AUTO: 12.3 % — HIGH (ref 3–10)
NEUTROPHILS # BLD AUTO: 8 K/UL — SIGNIFICANT CHANGE UP (ref 1.8–8)
NEUTROPHILS NFR BLD AUTO: 74.9 % — HIGH (ref 37–73)
PHOSPHATE SERPL-MCNC: 3.1 MG/DL — SIGNIFICANT CHANGE UP (ref 2.4–4.7)
PLATELET # BLD AUTO: 225 K/UL — SIGNIFICANT CHANGE UP (ref 150–400)
POTASSIUM SERPL-MCNC: 3.8 MMOL/L — SIGNIFICANT CHANGE UP (ref 3.5–5.3)
POTASSIUM SERPL-SCNC: 3.8 MMOL/L — SIGNIFICANT CHANGE UP (ref 3.5–5.3)
RBC # BLD: 4.21 M/UL — LOW (ref 4.6–6.2)
RBC # FLD: 13.5 % — SIGNIFICANT CHANGE UP (ref 11–15.6)
SODIUM SERPL-SCNC: 136 MMOL/L — SIGNIFICANT CHANGE UP (ref 135–145)
WBC # BLD: 10.7 K/UL — SIGNIFICANT CHANGE UP (ref 4.8–10.8)
WBC # FLD AUTO: 10.7 K/UL — SIGNIFICANT CHANGE UP (ref 4.8–10.8)

## 2019-06-07 PROCEDURE — 99231 SBSQ HOSP IP/OBS SF/LOW 25: CPT

## 2019-06-07 RX ORDER — GABAPENTIN 400 MG/1
300 CAPSULE ORAL EVERY 8 HOURS
Refills: 0 | Status: DISCONTINUED | OUTPATIENT
Start: 2019-06-07 | End: 2019-06-08

## 2019-06-07 RX ORDER — GABAPENTIN 400 MG/1
1 CAPSULE ORAL
Qty: 60 | Refills: 0
Start: 2019-06-07

## 2019-06-07 RX ORDER — ACETAMINOPHEN 500 MG
1000 TABLET ORAL ONCE
Refills: 0 | Status: COMPLETED | OUTPATIENT
Start: 2019-06-07 | End: 2019-06-07

## 2019-06-07 RX ORDER — ACETAMINOPHEN 500 MG
2 TABLET ORAL
Qty: 0 | Refills: 0 | DISCHARGE
Start: 2019-06-07

## 2019-06-07 RX ORDER — CEPHALEXIN 500 MG
500 CAPSULE ORAL
Refills: 0 | Status: DISCONTINUED | OUTPATIENT
Start: 2019-06-07 | End: 2019-06-08

## 2019-06-07 RX ORDER — HEPARIN SODIUM 5000 [USP'U]/ML
5000 INJECTION INTRAVENOUS; SUBCUTANEOUS EVERY 8 HOURS
Refills: 0 | Status: DISCONTINUED | OUTPATIENT
Start: 2019-06-07 | End: 2019-06-08

## 2019-06-07 RX ORDER — KETOROLAC TROMETHAMINE 30 MG/ML
15 SYRINGE (ML) INJECTION ONCE
Refills: 0 | Status: DISCONTINUED | OUTPATIENT
Start: 2019-06-07 | End: 2019-06-07

## 2019-06-07 RX ADMIN — HYDROMORPHONE HYDROCHLORIDE 1 MILLIGRAM(S): 2 INJECTION INTRAMUSCULAR; INTRAVENOUS; SUBCUTANEOUS at 01:18

## 2019-06-07 RX ADMIN — Medication 500 MILLIGRAM(S): at 05:11

## 2019-06-07 RX ADMIN — HYDROMORPHONE HYDROCHLORIDE 1 MILLIGRAM(S): 2 INJECTION INTRAMUSCULAR; INTRAVENOUS; SUBCUTANEOUS at 05:25

## 2019-06-07 RX ADMIN — OXYCODONE HYDROCHLORIDE 10 MILLIGRAM(S): 5 TABLET ORAL at 13:51

## 2019-06-07 RX ADMIN — OXYCODONE HYDROCHLORIDE 10 MILLIGRAM(S): 5 TABLET ORAL at 23:45

## 2019-06-07 RX ADMIN — HEPARIN SODIUM 5000 UNIT(S): 5000 INJECTION INTRAVENOUS; SUBCUTANEOUS at 05:26

## 2019-06-07 RX ADMIN — Medication 100 MILLIGRAM(S): at 23:00

## 2019-06-07 RX ADMIN — HEPARIN SODIUM 5000 UNIT(S): 5000 INJECTION INTRAVENOUS; SUBCUTANEOUS at 15:09

## 2019-06-07 RX ADMIN — HYDROMORPHONE HYDROCHLORIDE 1 MILLIGRAM(S): 2 INJECTION INTRAMUSCULAR; INTRAVENOUS; SUBCUTANEOUS at 05:11

## 2019-06-07 RX ADMIN — HYDROMORPHONE HYDROCHLORIDE 0.5 MILLIGRAM(S): 2 INJECTION INTRAMUSCULAR; INTRAVENOUS; SUBCUTANEOUS at 02:34

## 2019-06-07 RX ADMIN — Medication 1000 MILLIGRAM(S): at 11:40

## 2019-06-07 RX ADMIN — OXYCODONE HYDROCHLORIDE 10 MILLIGRAM(S): 5 TABLET ORAL at 14:51

## 2019-06-07 RX ADMIN — GABAPENTIN 300 MILLIGRAM(S): 400 CAPSULE ORAL at 22:59

## 2019-06-07 RX ADMIN — Medication 100 MILLIGRAM(S): at 15:17

## 2019-06-07 RX ADMIN — Medication 100 MILLIGRAM(S): at 05:11

## 2019-06-07 RX ADMIN — HYDROMORPHONE HYDROCHLORIDE 0.5 MILLIGRAM(S): 2 INJECTION INTRAMUSCULAR; INTRAVENOUS; SUBCUTANEOUS at 07:23

## 2019-06-07 RX ADMIN — OXYCODONE HYDROCHLORIDE 10 MILLIGRAM(S): 5 TABLET ORAL at 08:18

## 2019-06-07 RX ADMIN — HYDROMORPHONE HYDROCHLORIDE 0.5 MILLIGRAM(S): 2 INJECTION INTRAMUSCULAR; INTRAVENOUS; SUBCUTANEOUS at 02:48

## 2019-06-07 RX ADMIN — OXYCODONE HYDROCHLORIDE 10 MILLIGRAM(S): 5 TABLET ORAL at 09:18

## 2019-06-07 RX ADMIN — Medication 100 MILLIGRAM(S): at 13:50

## 2019-06-07 RX ADMIN — Medication 15 MILLIGRAM(S): at 11:40

## 2019-06-07 RX ADMIN — Medication 100 MILLIGRAM(S): at 22:59

## 2019-06-07 RX ADMIN — HYDROMORPHONE HYDROCHLORIDE 0.5 MILLIGRAM(S): 2 INJECTION INTRAMUSCULAR; INTRAVENOUS; SUBCUTANEOUS at 07:30

## 2019-06-07 RX ADMIN — GABAPENTIN 300 MILLIGRAM(S): 400 CAPSULE ORAL at 13:51

## 2019-06-07 RX ADMIN — OXYCODONE HYDROCHLORIDE 10 MILLIGRAM(S): 5 TABLET ORAL at 18:52

## 2019-06-07 RX ADMIN — Medication 500 MILLIGRAM(S): at 23:00

## 2019-06-07 RX ADMIN — OXYCODONE HYDROCHLORIDE 10 MILLIGRAM(S): 5 TABLET ORAL at 22:58

## 2019-06-07 RX ADMIN — HEPARIN SODIUM 5000 UNIT(S): 5000 INJECTION INTRAVENOUS; SUBCUTANEOUS at 23:00

## 2019-06-07 RX ADMIN — Medication 500 MILLIGRAM(S): at 13:50

## 2019-06-07 RX ADMIN — HYDROMORPHONE HYDROCHLORIDE 1 MILLIGRAM(S): 2 INJECTION INTRAMUSCULAR; INTRAVENOUS; SUBCUTANEOUS at 01:04

## 2019-06-07 RX ADMIN — Medication 500 MILLIGRAM(S): at 18:01

## 2019-06-07 RX ADMIN — Medication 400 MILLIGRAM(S): at 11:17

## 2019-06-07 RX ADMIN — Medication 15 MILLIGRAM(S): at 11:17
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
## 2019-06-07 NOTE — PROGRESS NOTE ADULT - SUBJECTIVE AND OBJECTIVE BOX
Patient seen and examined at bedside. c/o mild pain RLE at operative site. Patient states has not taken PO meds yet for his pain, has been taking IV dilaudid. Denies fever/chills, SOB/chest pain, abdominal pain. Patient admits to paresthesia left phalanges. NO other complaints.    Vital Signs Last 24 Hrs  T(C): 36.9 (07 Jun 2019 07:05), Max: 37.4 (06 Jun 2019 11:05)  T(F): 98.4 (07 Jun 2019 07:05), Max: 99.3 (06 Jun 2019 11:05)  HR: 99 (07 Jun 2019 07:05) (99 - 111)  BP: 154/94 (07 Jun 2019 07:05) (130/101 - 182/101)  BP(mean): --  RR: 28 (07 Jun 2019 07:05) (15 - 28)  SpO2: 99% (07 Jun 2019 07:05) (96% - 100%)    LUE: Patient not currently using elevation pillow, extremity resting on his lap. Splint C/D/I. 3rd digit bandage C/D/I. + ROM phalanges, pt reports paresthesia to light touch to all phalanges. ext warm cap refill brisk.  RLE: Splint C/D/I. + EHL/FHL. SILT. Ext warm cap refill brisk. compartments soft throughout.                          12.9   10.7  )-----------( 225      ( 07 Jun 2019 07:41 )             39.2     A/P: 49 y.o M s/p left wrist ORIF, I&D left 3rd DIP, carpal tunnel release, tendon/nail bed repair, and right ankle ORIF POD #1  - patient placed in elevation pillow, educated patient on importance of elevating extremity  - pain control discussed with patient and primary team  - DVTP  - NWZACHERY SILVA and ISABEL   - [facility] care primary team

## 2019-06-07 NOTE — PHYSICAL THERAPY INITIAL EVALUATION ADULT - ADDITIONAL COMMENTS
pt lives in an apartment at his aunts house. he has 3-4 stairs to enter without rail. pt says he may be able to use the main entrance that has 3-4 stairs with bilat rails. pt owns no DME.

## 2019-06-07 NOTE — PHYSICAL THERAPY INITIAL EVALUATION ADULT - ASSISTIVE DEVICE FOR STAIR TRANSFER, REHAB EVAL
initially pt was set up to try hop to step with Right UE on rail and PT on left of pt, but pt tried jumping before PT could set up pt and determine if he was able to and pt had increase pain in Right LE and needed to sit down on steps. pt did not have a fall. pt was helped to lower down on to the step. pt showed scooting up and down steps with CGA if railing is present to get up from step. initially pt was set up to try hop to step with Right UE on rail and PT on left of pt, but pt tried jumping before PT could set up pt and determine if he was able to and pt had increase pain in Right LE and needed to sit down on steps. pt did not have a fall. pt was helped to lower down on to the step. pt showed and performed scooting up and down steps with CGA if railing is present to get up from step.

## 2019-06-07 NOTE — DISCHARGE NOTE PROVIDER - PROVIDER TOKENS
PROVIDER:[TOKEN:[[id number]]],PROVIDER:[TOKEN:[[id number]]] PROVIDER:[TOKEN:[[id number]],FOLLOWUP:[1 week]],PROVIDER:[TOKEN:[[id number]],FOLLOWUP:[1 week]]
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
## 2019-06-07 NOTE — DISCHARGE NOTE PROVIDER - CARE PROVIDERS DIRECT ADDRESSES
[email or web address]

## 2019-06-07 NOTE — DISCHARGE NOTE PROVIDER - CARE PROVIDER_API CALL
Jeyson Sanchez)  Orthopaedic Surgery  [address] [place] [address]  Phone: [phone]  Fax: [phone]  Follow Up Time:     Lis Avila)  Orthopedics  [address] [place] [address], [place] [address]  Phone: [phone]  Fax: [phone]  Follow Up Time: Jeyson Sanchez)  Orthopaedic Surgery  [address] [place] [address]  Phone: [phone]  Fax: [phone]  Follow Up Time: 1 week    Lis Avila)  Orthopedics  [address] [place] [address], [place] [address]  Phone: [phone]  Fax: [phone]  Follow Up Time: 1 week

## 2019-06-07 NOTE — DISCHARGE NOTE PROVIDER - NSDCFUADDINST_GEN_ALL_CORE_FT
Orthopedic discharge instructions: Please call to make appointment to be seen in office by Dr. Sanchez and Dr. Avila in 2 weeks. Patient is to take Keflex twice a day for a total of 10 days postoperatively. Patient is non-weight bearing of Left upper extremity and Right lower extremity. Maintain splints to Left upper extremity and Right lower extremity. Do not remove splints, do not get wet. Continue to elevate Left upper and Right lower extremity to reduce swelling.
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
## 2019-06-07 NOTE — PHYSICAL THERAPY INITIAL EVALUATION ADULT - GENERAL OBSERVATIONS, REHAB EVAL
Pt received in chair + Right LE in ace bandage and Left LE in splint and ace bandage. pt in NAD and agreeable to mobilize with PT.
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
## 2019-06-07 NOTE — PHYSICAL THERAPY INITIAL EVALUATION ADULT - GAIT DEVIATIONS NOTED, PT EVAL
decreased neli/decreased stride length/increased stride width/decreased weight-shifting ability decreased stride length/decreased weight-shifting ability/decreased neli

## 2019-06-07 NOTE — OCCUPATIONAL THERAPY INITIAL EVALUATION ADULT - ADDITIONAL COMMENTS
Pt states lives in an apartment in the aunts home; [address] with a rail and flat inside or can stay with aunt in main level which is [address] with ANA. rails and flat inside. Pt has shower chair and commode at home from uncle and will obtain cast sleeves for showering. Kang dressing discussed. Pts aunt can assist if need as per pt and pt was independent prior.

## 2019-06-08 ENCOUNTER — TRANSCRIPTION ENCOUNTER (OUTPATIENT)
Age: 49
End: 2019-06-08

## 2019-06-08 VITALS
DIASTOLIC BLOOD PRESSURE: 93 MMHG | HEART RATE: 93 BPM | RESPIRATION RATE: 18 BRPM | OXYGEN SATURATION: 98 % | TEMPERATURE: 98 F | SYSTOLIC BLOOD PRESSURE: 150 MMHG

## 2019-06-08 PROCEDURE — 71045 X-RAY EXAM CHEST 1 VIEW: CPT

## 2019-06-08 PROCEDURE — 36415 COLL VENOUS BLD VENIPUNCTURE: CPT

## 2019-06-08 PROCEDURE — G0390: CPT

## 2019-06-08 PROCEDURE — 99238 HOSP IP/OBS DSCHRG MGMT 30/<: CPT

## 2019-06-08 PROCEDURE — 90715 TDAP VACCINE 7 YRS/> IM: CPT

## 2019-06-08 PROCEDURE — 86901 BLOOD TYPING SEROLOGIC RH(D): CPT

## 2019-06-08 PROCEDURE — 80307 DRUG TEST PRSMV CHEM ANLYZR: CPT

## 2019-06-08 PROCEDURE — 83605 ASSAY OF LACTIC ACID: CPT

## 2019-06-08 PROCEDURE — 81003 URINALYSIS AUTO W/O SCOPE: CPT

## 2019-06-08 PROCEDURE — 84100 ASSAY OF PHOSPHORUS: CPT

## 2019-06-08 PROCEDURE — 85027 COMPLETE CBC AUTOMATED: CPT

## 2019-06-08 PROCEDURE — 86923 COMPATIBILITY TEST ELECTRIC: CPT

## 2019-06-08 PROCEDURE — 73700 CT LOWER EXTREMITY W/O DYE: CPT

## 2019-06-08 PROCEDURE — 80053 COMPREHEN METABOLIC PANEL: CPT

## 2019-06-08 PROCEDURE — 97116 GAIT TRAINING THERAPY: CPT

## 2019-06-08 PROCEDURE — 72170 X-RAY EXAM OF PELVIS: CPT

## 2019-06-08 PROCEDURE — 83690 ASSAY OF LIPASE: CPT

## 2019-06-08 PROCEDURE — 73590 X-RAY EXAM OF LOWER LEG: CPT

## 2019-06-08 PROCEDURE — 97167 OT EVAL HIGH COMPLEX 60 MIN: CPT

## 2019-06-08 PROCEDURE — C1713: CPT

## 2019-06-08 PROCEDURE — 76377 3D RENDER W/INTRP POSTPROCES: CPT

## 2019-06-08 PROCEDURE — 73060 X-RAY EXAM OF HUMERUS: CPT

## 2019-06-08 PROCEDURE — 72125 CT NECK SPINE W/O DYE: CPT

## 2019-06-08 PROCEDURE — 73600 X-RAY EXAM OF ANKLE: CPT

## 2019-06-08 PROCEDURE — 85610 PROTHROMBIN TIME: CPT

## 2019-06-08 PROCEDURE — 97163 PT EVAL HIGH COMPLEX 45 MIN: CPT

## 2019-06-08 PROCEDURE — 70450 CT HEAD/BRAIN W/O DYE: CPT

## 2019-06-08 PROCEDURE — 71260 CT THORAX DX C+: CPT

## 2019-06-08 PROCEDURE — 73610 X-RAY EXAM OF ANKLE: CPT

## 2019-06-08 PROCEDURE — 96374 THER/PROPH/DIAG INJ IV PUSH: CPT | Mod: XU

## 2019-06-08 PROCEDURE — 83735 ASSAY OF MAGNESIUM: CPT

## 2019-06-08 PROCEDURE — 74177 CT ABD & PELVIS W/CONTRAST: CPT

## 2019-06-08 PROCEDURE — 97530 THERAPEUTIC ACTIVITIES: CPT

## 2019-06-08 PROCEDURE — 85730 THROMBOPLASTIN TIME PARTIAL: CPT

## 2019-06-08 PROCEDURE — 97110 THERAPEUTIC EXERCISES: CPT

## 2019-06-08 PROCEDURE — 73200 CT UPPER EXTREMITY W/O DYE: CPT

## 2019-06-08 PROCEDURE — 73100 X-RAY EXAM OF WRIST: CPT

## 2019-06-08 PROCEDURE — 76000 FLUOROSCOPY <1 HR PHYS/QHP: CPT

## 2019-06-08 PROCEDURE — 99291 CRITICAL CARE FIRST HOUR: CPT | Mod: 25

## 2019-06-08 PROCEDURE — 73110 X-RAY EXAM OF WRIST: CPT

## 2019-06-08 PROCEDURE — 80048 BASIC METABOLIC PNL TOTAL CA: CPT

## 2019-06-08 PROCEDURE — 86850 RBC ANTIBODY SCREEN: CPT

## 2019-06-08 PROCEDURE — 73030 X-RAY EXAM OF SHOULDER: CPT

## 2019-06-08 PROCEDURE — 73090 X-RAY EXAM OF FOREARM: CPT

## 2019-06-08 PROCEDURE — 73610 X-RAY EXAM OF ANKLE: CPT | Mod: 26,RT

## 2019-06-08 PROCEDURE — 86900 BLOOD TYPING SEROLOGIC ABO: CPT

## 2019-06-08 PROCEDURE — 73120 X-RAY EXAM OF HAND: CPT

## 2019-06-08 PROCEDURE — 73130 X-RAY EXAM OF HAND: CPT

## 2019-06-08 RX ORDER — CEPHALEXIN 500 MG
1 CAPSULE ORAL
Qty: 36 | Refills: 0
Start: 2019-06-08 | End: 2019-06-16

## 2019-06-08 RX ORDER — DOCUSATE SODIUM 100 MG
1 CAPSULE ORAL
Qty: 30 | Refills: 0
Start: 2019-06-08 | End: 2019-07-07

## 2019-06-08 RX ORDER — CEPHALEXIN 500 MG
1 CAPSULE ORAL
Qty: 0 | Refills: 0 | DISCHARGE
Start: 2019-06-08

## 2019-06-08 RX ORDER — OXYCODONE HYDROCHLORIDE 5 MG/1
1 TABLET ORAL
Qty: 20 | Refills: 0
Start: 2019-06-08 | End: 2019-06-12

## 2019-06-08 RX ORDER — SENNA PLUS 8.6 MG/1
2 TABLET ORAL
Qty: 60 | Refills: 0
Start: 2019-06-08 | End: 2019-07-07

## 2019-06-08 RX ORDER — DOCUSATE SODIUM 100 MG
1 CAPSULE ORAL
Qty: 0 | Refills: 0 | DISCHARGE
Start: 2019-06-08

## 2019-06-08 RX ORDER — SENNA PLUS 8.6 MG/1
1 TABLET ORAL
Qty: 0 | Refills: 0 | DISCHARGE
Start: 2019-06-08

## 2019-06-08 RX ORDER — OXYCODONE HYDROCHLORIDE 5 MG/1
1 TABLET ORAL
Qty: 0 | Refills: 0 | DISCHARGE
Start: 2019-06-08

## 2019-06-08 RX ADMIN — HEPARIN SODIUM 5000 UNIT(S): 5000 INJECTION INTRAVENOUS; SUBCUTANEOUS at 05:43

## 2019-06-08 RX ADMIN — OXYCODONE HYDROCHLORIDE 10 MILLIGRAM(S): 5 TABLET ORAL at 09:03

## 2019-06-08 RX ADMIN — Medication 500 MILLIGRAM(S): at 12:27

## 2019-06-08 RX ADMIN — HEPARIN SODIUM 5000 UNIT(S): 5000 INJECTION INTRAVENOUS; SUBCUTANEOUS at 12:27

## 2019-06-08 RX ADMIN — Medication 100 MILLIGRAM(S): at 05:44

## 2019-06-08 RX ADMIN — OXYCODONE HYDROCHLORIDE 10 MILLIGRAM(S): 5 TABLET ORAL at 13:00

## 2019-06-08 RX ADMIN — OXYCODONE HYDROCHLORIDE 10 MILLIGRAM(S): 5 TABLET ORAL at 12:27

## 2019-06-08 RX ADMIN — OXYCODONE HYDROCHLORIDE 10 MILLIGRAM(S): 5 TABLET ORAL at 16:37

## 2019-06-08 RX ADMIN — Medication 500 MILLIGRAM(S): at 16:36

## 2019-06-08 RX ADMIN — OXYCODONE HYDROCHLORIDE 10 MILLIGRAM(S): 5 TABLET ORAL at 08:08

## 2019-06-08 RX ADMIN — OXYCODONE HYDROCHLORIDE 10 MILLIGRAM(S): 5 TABLET ORAL at 04:00

## 2019-06-08 RX ADMIN — Medication 100 MILLIGRAM(S): at 12:27

## 2019-06-08 RX ADMIN — GABAPENTIN 300 MILLIGRAM(S): 400 CAPSULE ORAL at 05:44

## 2019-06-08 RX ADMIN — Medication 500 MILLIGRAM(S): at 05:44

## 2019-06-08 RX ADMIN — OXYCODONE HYDROCHLORIDE 10 MILLIGRAM(S): 5 TABLET ORAL at 03:18

## 2019-06-08 RX ADMIN — GABAPENTIN 300 MILLIGRAM(S): 400 CAPSULE ORAL at 12:27

## 2019-06-08 NOTE — DISCHARGE NOTE NURSING/CASE MANAGEMENT/SOCIAL WORK - NSDCDPATPORTLINK_GEN_ALL_CORE
You can access the [facility] Portal, offered by [facility], by registering with the following website: [email or web address]

## 2019-06-08 NOTE — PROGRESS NOTE ADULT - SUBJECTIVE AND OBJECTIVE BOX
HPI/OVERNIGHT EVENTS:  No acute events overnight. Patient was evaluated at bedside and was found afebrile, HDS and in no acute distress. Patient states he is feeling better and wants to go home. Denies nausea, vomiting, fever, chest pain or SOB.    MEDICATIONS  (STANDING):  cephalexin 500 milliGRAM(s) Oral four times a day  clindamycin IVPB 600 milliGRAM(s) IV Intermittent every 8 hours  docusate sodium 100 milliGRAM(s) Oral three times a day  gabapentin 300 milliGRAM(s) Oral every 8 hours  heparin  Injectable 5000 Unit(s) SubCutaneous every 8 hours  polyethylene glycol 3350 17 Gram(s) Oral daily  senna 1 Tablet(s) Oral at bedtime    MEDICATIONS  (PRN):  acetaminophen   Tablet .. 650 milliGRAM(s) Oral every 6 hours PRN Temp greater or equal to 38C (100.4F), Mild Pain (1 - 3)  aluminum hydroxide/magnesium hydroxide/simethicone Suspension 30 milliLiter(s) Oral four times a day PRN Indigestion  bisacodyl Suppository 10 milliGRAM(s) Rectal daily PRN If no bowel movement by postoperative day #2  magnesium hydroxide Suspension 30 milliLiter(s) Oral daily PRN Constipation  ondansetron Injectable 4 milliGRAM(s) IV Push every 6 hours PRN Nausea and/or Vomiting  oxyCODONE    IR 5 milliGRAM(s) Oral every 4 hours PRN Moderate Pain (4 - 6)  oxyCODONE    IR 10 milliGRAM(s) Oral every 4 hours PRN Severe Pain (7 - 10)  senna 2 Tablet(s) Oral at bedtime PRN Constipation      Vital Signs Last 24 Hrs  T(C): 36.9 (08 Jun 2019 04:42), Max: 37.9 (07 Jun 2019 20:30)  T(F): 98.4 (08 Jun 2019 04:42), Max: 100.2 (07 Jun 2019 20:30)  HR: 98 (08 Jun 2019 04:42) (98 - 102)  BP: 140/88 (08 Jun 2019 04:42) (140/88 - 160/90)  BP(mean): --  RR: 18 (08 Jun 2019 04:42) (18 - 18)  SpO2: 95% (08 Jun 2019 04:42) (95% - 100%)    Constitutional: patient resting comfortably in bed, in no acute distress  HEENT: EOMI / PERRLA, MMM  Respiratory: non labored breathing.   Cardiovascular: RRR  Gastrointestinal: Abdomen soft, non-tender, non-distended, no rebound tenderness / guarding  Msk: LUE splinted, capillary refill 2 sec, mild numbness in first digit, rest of digit with intact sensation, all digitis w intact ROM, warm to touch. RLE spinted, capillary refill 2 sec, intact sensation in all digits, intact ROM in all digits, warm to touch        I&O's Detail    07 Jun 2019 07:01  -  08 Jun 2019 07:00  --------------------------------------------------------  IN:  Total IN: 0 mL    OUT:    Voided: 500 mL  Total OUT: 500 mL    Total NET: -500 mL          LABS:                        12.9   10.7  )-----------( 225      ( 07 Jun 2019 07:41 )             39.2     06-07    136  |  100  |  10.0  ----------------------------<  112  3.8   |  26.0  |  0.80    Ca    8.5<L>      07 Jun 2019 07:41  Phos  3.1     06-07  Mg     2.1     06-07

## 2019-06-08 NOTE — PROGRESS NOTE ADULT - PROVIDER SPECIALTY LIST ADULT
Anesthesia
Orthopedics
Trauma Surgery
Orthopedics
Trauma Surgery

## 2019-06-08 NOTE — PROGRESS NOTE ADULT - SUBJECTIVE AND OBJECTIVE BOX
Patient seen and examined s/p right ankle ORIF and left wrist ORIF, POD # 2. C/O  pain at operative sites consistent with surgery. participating with physical therapy Denies fever/chills, SOB/chest pain.  Reports no new complaints.    Vital Signs Last 24 Hrs  T(C): 36.9 (08 Jun 2019 07:08), Max: 37.9 (07 Jun 2019 20:30)  T(F): 98.5 (08 Jun 2019 07:08), Max: 100.2 (07 Jun 2019 20:30)  HR: 93 (08 Jun 2019 07:08) (93 - 102)  BP: 150/93 (08 Jun 2019 07:08) (140/88 - 160/90)  BP(mean): --  RR: 18 (08 Jun 2019 07:08) (18 - 18)  SpO2: 98% (08 Jun 2019 07:08) (95% - 100%)    PE: NAD, resting comfortably  LUE: extremity noted elevated in elevation pillow. Splint C/D/I. 3rd digit bandage C/D/I.   Gross motor intact: + ROM digits, digits warm, well perfused, cap refill brisk.. ext warm   +gross distal sensation intact to light .   RLE: Splint C/D/I   gross motor intact: able to wiggle toes, + EHL/FHL.   gross distal sensation intact  Ext warm cap refill brisk. compartments soft throughout.                                      12.9   10.7  )-----------( 225      ( 07 Jun 2019 07:41 )             39.2       A/P: 49 y.o M s/p left wrist ORIF, I&D left 3rd DIP, carpal tunnel release, tendon/nail bed repair, and right ankle ORIF POD #2    pain control   PO Keflex X 10 days  DVT PPX  maintain splints, keep clean and dry  upper and lower extremity elevation to help decrease swelling  PT/OT: LETICIA SILVA and ISABEL   Ortho stable, cont care primary team
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
## 2019-06-10 PROBLEM — Z78.9 OTHER SPECIFIED HEALTH STATUS: Chronic | Status: ACTIVE | Noted: 2019-06-05

## 2019-06-10 PROBLEM — Z00.00 ENCOUNTER FOR PREVENTIVE HEALTH EXAMINATION: Status: ACTIVE | Noted: 2019-06-10

## 2019-06-10 RX ORDER — OXYCODONE HYDROCHLORIDE 5 MG/1
1 TABLET ORAL
Qty: 20 | Refills: 0
Start: 2019-06-10 | End: 2019-06-14

## 2019-06-20 ENCOUNTER — APPOINTMENT (OUTPATIENT)
Dept: ORTHOPEDIC SURGERY | Facility: CLINIC | Age: 49
End: 2019-06-20
Payer: MEDICAID

## 2019-06-20 ENCOUNTER — OTHER (OUTPATIENT)
Age: 49
End: 2019-06-20

## 2019-06-20 VITALS
WEIGHT: 178 LBS | DIASTOLIC BLOOD PRESSURE: 103 MMHG | BODY MASS INDEX: 26.98 KG/M2 | HEART RATE: 90 BPM | SYSTOLIC BLOOD PRESSURE: 157 MMHG | HEIGHT: 68 IN

## 2019-06-20 PROCEDURE — 73110 X-RAY EXAM OF WRIST: CPT | Mod: LT

## 2019-06-20 PROCEDURE — 73140 X-RAY EXAM OF FINGER(S): CPT | Mod: F2

## 2019-06-20 PROCEDURE — 99024 POSTOP FOLLOW-UP VISIT: CPT

## 2019-06-20 PROCEDURE — 73610 X-RAY EXAM OF ANKLE: CPT | Mod: TC,RT

## 2019-06-20 RX ORDER — OXYCODONE 5 MG/1
5 TABLET ORAL EVERY 6 HOURS
Qty: 20 | Refills: 0 | Status: ACTIVE | COMMUNITY
Start: 2019-06-20 | End: 1900-01-01
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
## 2019-06-23 RX ORDER — CEPHALEXIN 750 MG/1
CAPSULE ORAL
Refills: 0 | Status: ACTIVE | COMMUNITY

## 2019-06-23 RX ORDER — DOCUSATE SODIUM 100 MG/1
CAPSULE ORAL
Refills: 0 | Status: ACTIVE | COMMUNITY

## 2019-06-27 ENCOUNTER — APPOINTMENT (OUTPATIENT)
Dept: ORTHOPEDIC SURGERY | Facility: CLINIC | Age: 49
End: 2019-06-27
Payer: MEDICAID

## 2019-06-27 VITALS
DIASTOLIC BLOOD PRESSURE: 94 MMHG | BODY MASS INDEX: 26.98 KG/M2 | HEIGHT: 68 IN | WEIGHT: 178 LBS | HEART RATE: 85 BPM | SYSTOLIC BLOOD PRESSURE: 155 MMHG

## 2019-06-27 PROCEDURE — 99024 POSTOP FOLLOW-UP VISIT: CPT

## 2019-06-27 RX ORDER — OXYCODONE 5 MG/1
5 TABLET ORAL EVERY 6 HOURS
Qty: 20 | Refills: 0 | Status: ACTIVE | COMMUNITY
Start: 2019-06-27 | End: 1900-01-01

## 2019-07-08 ENCOUNTER — APPOINTMENT (OUTPATIENT)
Dept: ORTHOPEDIC SURGERY | Facility: CLINIC | Age: 49
End: 2019-07-08
Payer: MEDICAID

## 2019-07-08 VITALS
WEIGHT: 178 LBS | HEIGHT: 68 IN | BODY MASS INDEX: 26.98 KG/M2 | DIASTOLIC BLOOD PRESSURE: 93 MMHG | HEART RATE: 81 BPM | SYSTOLIC BLOOD PRESSURE: 151 MMHG

## 2019-07-08 DIAGNOSIS — Z78.9 OTHER SPECIFIED HEALTH STATUS: ICD-10-CM

## 2019-07-08 DIAGNOSIS — Z80.9 FAMILY HISTORY OF MALIGNANT NEOPLASM, UNSPECIFIED: ICD-10-CM

## 2019-07-08 PROCEDURE — 29130 APPL FINGER SPLINT STATIC: CPT | Mod: 58,F2

## 2019-07-08 PROCEDURE — 99024 POSTOP FOLLOW-UP VISIT: CPT

## 2019-07-08 PROCEDURE — 73140 X-RAY EXAM OF FINGER(S): CPT | Mod: F2

## 2019-07-17 ENCOUNTER — OTHER (OUTPATIENT)
Age: 49
End: 2019-07-17

## 2019-07-18 ENCOUNTER — APPOINTMENT (OUTPATIENT)
Dept: ORTHOPEDIC SURGERY | Facility: CLINIC | Age: 49
End: 2019-07-18
Payer: MEDICAID

## 2019-07-18 VITALS
WEIGHT: 178 LBS | SYSTOLIC BLOOD PRESSURE: 166 MMHG | HEIGHT: 68 IN | HEART RATE: 93 BPM | BODY MASS INDEX: 26.98 KG/M2 | DIASTOLIC BLOOD PRESSURE: 105 MMHG

## 2019-07-18 PROCEDURE — 99024 POSTOP FOLLOW-UP VISIT: CPT

## 2019-07-18 PROCEDURE — 73610 X-RAY EXAM OF ANKLE: CPT | Mod: TC,RT

## 2019-07-18 PROCEDURE — 73110 X-RAY EXAM OF WRIST: CPT | Mod: LT

## 2019-07-18 PROCEDURE — 73140 X-RAY EXAM OF FINGER(S): CPT | Mod: F2

## 2019-07-18 NOTE — HISTORY OF PRESENT ILLNESS
[___ Weeks Post Op] : [unfilled] weeks post op [Xray (Date:___)] : [unfilled] Xray -  [Healed] : healed [Neuro Intact] : an unremarkable neurological exam [Vascular Intact] : ~T peripheral vascular exam normal [Hardware in Good Position] : hardware in good position [Doing Well] : is doing well [No Sign of Infection] : is showing no signs of infection [Adequate Pain Control] : has adequate pain control [Erythema] : not erythematous [Discharge] : absent of discharge [Dehiscence] : not dehisced [de-identified] :  ORIF, right bimalleolar ankle fracture.DOS:06/06/19\par  Right ankle arthrotomy.\par  Right ankle stress examination under anesthesia, xray.\par  [de-identified] : 50 yo m s/p  ORIF, right bimalleolar ankle fracture.DOS:06/06/19\par  Right ankle arthrotomy here for pop visit.  Patient has been NWB in CAM boot.  He does not c/o pain at this time.  he has had swelling of his ankle and foot which improves with elevation.  He c/o of left shoulder pain for which he has an appt to see Dr. Tellez on 7/31/19.\par  [de-identified] : mild swelling of dorsum of foot,\par \par  [de-identified] : right ankle xray 3 views [de-identified] : Patient will have Physical therapy for Advance to WBAT in CAM boot RLE, AAROM/PROM  right ankle,\par  Non weight bearing left wrist, ok to weight bear through left elbow.  A MRI of the left shoulder was ordered.  Patient will see Dr. Tellez for shoulder pain.  He will return to office in about 4 weeks for eval and xray right ankle/

## 2019-07-19 ENCOUNTER — OTHER (OUTPATIENT)
Age: 49
End: 2019-07-19

## 2019-07-31 ENCOUNTER — APPOINTMENT (OUTPATIENT)
Dept: ORTHOPEDIC SURGERY | Facility: CLINIC | Age: 49
End: 2019-07-31
Payer: MEDICAID

## 2019-07-31 VITALS
TEMPERATURE: 98.4 F | DIASTOLIC BLOOD PRESSURE: 101 MMHG | HEART RATE: 90 BPM | WEIGHT: 178 LBS | HEIGHT: 68 IN | BODY MASS INDEX: 26.98 KG/M2 | SYSTOLIC BLOOD PRESSURE: 163 MMHG

## 2019-07-31 DIAGNOSIS — M25.512 PAIN IN LEFT SHOULDER: ICD-10-CM

## 2019-07-31 PROCEDURE — 73030 X-RAY EXAM OF SHOULDER: CPT | Mod: LT

## 2019-07-31 PROCEDURE — 99214 OFFICE O/P EST MOD 30 MIN: CPT | Mod: 24

## 2019-07-31 NOTE — REASON FOR VISIT
[Initial Visit] : an initial visit for [Shoulder Pain] : shoulder pain [FreeTextEntry2] : Left Shoulder Pain
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
## 2019-07-31 NOTE — CONSULT LETTER
[Dear  ___] : Dear  [unfilled], [Consult Letter:] : I had the pleasure of evaluating your patient, [unfilled]. [Please see my note below.] : Please see my note below. [Consult Closing:] : Thank you very much for allowing me to participate in the care of this patient.  If you have any questions, please do not hesitate to contact me. [Sincerely,] : Sincerely, [FreeTextEntry3] : Kennedy Tellez DO.\par Sports Medicine \par \par Orthopaedic Surgery\par \par [facility] [place] [facility]\par \par [address] \par [address]\[place] [address]\par \par [address] [facility] \[address] \[place] [address]\par \par Tel [phone]\par Fax [phone]\par  [FreeTextEntry2] : Vania Ramos M.D

## 2019-07-31 NOTE — PHYSICAL EXAM
[de-identified] : Physical Exam:\par General: Well appearing, no acute distress\par Neurologic: A&Ox3, No focal deficits\par Head: NCAT without abrasions, lacerations, or ecchymosis to head, face, or scalp\par Eyes: No scleral icterus, no gross abnormalities\par Respiratory: Equal chest wall expansion bilaterally, no accessory muscle use\par Lymphatic: No lymphadenopathy palpated\par Skin: Warm and dry\par Psychiatric: Normal mood and affect\par Left Shoulder\par ·	Inspection/Palpation: no tenderness, swelling or deformities\par ·	Range of Motion: no crepitus with ROM; Active FF 90; ER at side 15; IR to belt; Passive ; ER at side 25; IR to belt\par ·	Strength: forward elevation in scapular plane [4/5], internal rotation [4/5], external rotation [4/5], adduction [4/5] and abduction [4/5]\par ·	Stability: no joint instability on provocative testing\par ·	Tests: Pérez test positive, Neer positive, positive drop arm test secondary to pain, bear hug test positive, Napolean sign negative, cross arm adduction negative, lift off sign positive, hornblowers sign negative, speeds test negative, Yergason's test negative, no bicipital groove tenderness, Scott's Active Compression test negative, whipple test positive, bicep's load II test negative\par \par Right Shoulder\par ·	Inspection/Palpation: no tenderness, swelling or deformities\par ·	Range of Motion: full and painless in all planes, no crepitus\par ·	Strength: forward elevation in scapular plane 5/5, internal rotation 5/5, external rotation 5/5, adduction 5/5 and abduction 5/5\par ·	Stability: no joint instability on provocative testing\par ·	Tests: Pérez test negative, Neer sign negative, negative drop arm test secondary to pain, bear hug test negative, Napolean sign negative, cross arm adduction negative, lift off sign positive, hornblowers sign negative, speeds test negative, Yergason's test negative, no bicipital groove tenderness, Scott's Active Compression test negative\par \par  [de-identified] : X-rays of the left shoulder were performed today and available for me to review. 4 views of the left shoulder demonstrate no fracture or dislocation. [No] glenohumeral degenerative changes noted. [No] AC joint degenerative changes noted. No gross deformities noted.

## 2019-08-07 ENCOUNTER — FORM ENCOUNTER (OUTPATIENT)
Age: 49
End: 2019-08-07

## 2019-08-08 ENCOUNTER — APPOINTMENT (OUTPATIENT)
Dept: MRI IMAGING | Facility: CLINIC | Age: 49
End: 2019-08-08
Payer: MEDICAID

## 2019-08-08 ENCOUNTER — OUTPATIENT (OUTPATIENT)
Dept: OUTPATIENT SERVICES | Facility: HOSPITAL | Age: 49
LOS: 1 days | End: 2019-08-08
Payer: MEDICAID

## 2019-08-08 DIAGNOSIS — Z00.8 ENCOUNTER FOR OTHER GENERAL EXAMINATION: ICD-10-CM

## 2019-08-08 PROCEDURE — 73221 MRI JOINT UPR EXTREM W/O DYE: CPT

## 2019-08-08 PROCEDURE — 73221 MRI JOINT UPR EXTREM W/O DYE: CPT | Mod: 26,LT

## 2019-08-14 ENCOUNTER — APPOINTMENT (OUTPATIENT)
Dept: ORTHOPEDIC SURGERY | Facility: CLINIC | Age: 49
End: 2019-08-14
Payer: MEDICAID

## 2019-08-14 PROCEDURE — 73110 X-RAY EXAM OF WRIST: CPT | Mod: LT

## 2019-08-14 PROCEDURE — 99024 POSTOP FOLLOW-UP VISIT: CPT

## 2019-08-14 PROCEDURE — 73610 X-RAY EXAM OF ANKLE: CPT | Mod: TC,RT

## 2019-08-14 PROCEDURE — 73140 X-RAY EXAM OF FINGER(S): CPT | Mod: F2

## 2019-08-14 NOTE — HISTORY OF PRESENT ILLNESS
[Doing Well] : is doing well [de-identified] : DOS 6/6/19\par 1. left open carpal tunnel release\par 2. left distal radius ORIF at least 3 intraarticular fragments\par 3. left middle finger DIP joint I&D\par 4. left middle finger extensor repair\par 5. left middle finger nail bed repair\par  [de-identified] : 08/14/2019\par Mr. ESPINOZA OWEN returns for follow up about 10 weeks after the above procedures.  He reports improvement of left middle finger pain, now the fingertip is no longer sensitive to touch.  He has been intermittently taking off the finger splint and did not note any drooping at the DIP.  His nail also started to grow.  He is making good progress with OT for his wrist, and reports minimal pain.  He denies fevers or chills.  The occasional paresthesia at the palm surrounding the incision has also improved.  He has had left shoulder MRI and will continue to follow up with Dr. Tellez.\par \par 07/18/2019\par Mr. ESPINOZA OWEN returns for follow up about 6 weeks after the above procedures.  He reports improvement of left middle finger pain and posture.  He also has minimal pain in the left wrist.  He is doing well with therapy.  He denies fevers or chills.  He has occasional paresthesia at the palm surrounding the incision but feels light touch.  He also noted some pain in the left shoulder since hospital discharge and is scheduled to see my sports medicine partner Dr. Tellez.\par \par 07/08/2019\par Mr. ESPINOZA OWEN returns for follow up about 4 weeks and 4 days after the above procedures.  He was doing well overall but was concerned about the pain in his left middle finger and would like to have it checked.  He felt that he was still swollen.  He had trouble changing dressing for the left middle finger and was not able to maintain DIP extension with the splint.  He denies fevers or chills, and denies wound drainage.  He has started therapy for the wrist and tolerating it well.  Reports minimal pain in the wrist.  He denies paresthesia.\par \par 06/27/2019\par Mr. ESPINOZA OWEN returns for follow up about 3 weeks status post the above procedures.  He still has some pain in the left middle finger and left ankle but overall improving.  He tolerated the finger splint and wrist brace well.  He has not been able to start therapy on the left wrist due to scheduling conflict.  Otherwise no new complaints.\par \par 06/20/2019\par Mr. ESPINOZA OWEN returns for follow up about 2 weeks status post the above procedures.  Reports minimal pain at the wrist incision but more pain in the middle finger.  Denies fevers or chills.  Denies paresthesia.  No other complaints.  He is also being seen by our GILA Garcia for the first post op visit for his right ankle.   [de-identified] : Came in with a walker with platform attachment on the left side.  Walking with antalgic gait from right ankle ORIF.  NAD, breathing comfortably on room air.  Answers questions appropriately.  LUE fracture brace removed, incision well healed with no erythema or drainage, no signs of infection.  Mild appropriate post op swelling in volar wrist, continue to improve compared to last visit.  Wrist flexion and extension continues to improve, and forearm rotation also improved.  DRUJ stable and nontender.  Middle finger dorsal extension splint in place, good finger posture with PIP in extension after splint removal.  Mild swelling in the finger improved compared to last visit.  Stiffness in the PIP and DIP.  Dorsal DIP wound well healed without drainage or signs of infection; nail bed clean and dry with no signs of infection.  New nail is coming out.  Middle finger distal and ulnar pulp no tenderness to touch with palpable bony prominence under the skin.  Middle finger volar laceration well healed.  Able to flex and extend all the other fingers with mild stiffness.  Small finger PIP flexion contracture at baseline from a remote injury.  Sensation intact in all fingers.  All digits warm and well perfused. [de-identified] : X-rays of left middle finger in extension splint (3 views) were obtained in Office today and reviewed with patient, showing moderately displaced extraarticular distal phalanx fracture with mild flexion at DIP joint, unchanged compared to previous visit.  Evidence of fracture healing and remodeling.\par  \par X-rays of left wrist (3 views) were obtained in the office today and reviewed with patient, showing distal radius fracture ORIF with hardware in place in good position, healing with increased callus formation and fracture site remodeling, overall good fracture alignment unchanged from intraop fluoroscopic images. [de-identified] : 49 year old male at 10 weeks status post the above procedures, doing well.\par \par -We discussed in detail the clinical and imaging findings\par -We discussed that the fracture in the distal phalanx of the middle finger is in a reasonable position and the DIP is with mild flexion on  imaging, however clinically there is no extension lag.\par -I reassured patient that otherwise he is healing well in the left middle finger and left wrist\par -Again we discussed that the nail will grow back in a few months but may have some deformity\par -I updated his OT prescription to add strengthening, and he can discontinue the brace.  WBAT.  He can also start OT for the middle finger for DIP ROM.\par -I will see him in six weeks for reassessment in conjunction with my orthopaedic Trauma colleagues, sooner if needed\par -He had the opportunity to ask questions and was in agreement with the plan.

## 2019-08-15 ENCOUNTER — APPOINTMENT (OUTPATIENT)
Dept: ORTHOPEDIC SURGERY | Facility: CLINIC | Age: 49
End: 2019-08-15
Payer: MEDICAID

## 2019-08-15 VITALS
HEART RATE: 90 BPM | WEIGHT: 178 LBS | BODY MASS INDEX: 26.98 KG/M2 | HEIGHT: 68 IN | SYSTOLIC BLOOD PRESSURE: 154 MMHG | DIASTOLIC BLOOD PRESSURE: 100 MMHG

## 2019-08-15 PROCEDURE — 20610 DRAIN/INJ JOINT/BURSA W/O US: CPT | Mod: LT,58

## 2019-08-15 PROCEDURE — 99214 OFFICE O/P EST MOD 30 MIN: CPT | Mod: 24,25
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
## 2019-09-25 ENCOUNTER — APPOINTMENT (OUTPATIENT)
Dept: ORTHOPEDIC SURGERY | Facility: CLINIC | Age: 49
End: 2019-09-25
Payer: MEDICAID

## 2019-09-25 VITALS
DIASTOLIC BLOOD PRESSURE: 101 MMHG | SYSTOLIC BLOOD PRESSURE: 168 MMHG | BODY MASS INDEX: 26.52 KG/M2 | WEIGHT: 175 LBS | TEMPERATURE: 98.3 F | HEIGHT: 68 IN | HEART RATE: 78 BPM

## 2019-09-25 DIAGNOSIS — S82.891D OTHER FRACTURE OF RIGHT LOWER LEG, SUBSEQUENT ENCOUNTER FOR CLOSED FRACTURE WITH ROUTINE HEALING: ICD-10-CM

## 2019-09-25 PROCEDURE — 99214 OFFICE O/P EST MOD 30 MIN: CPT

## 2019-09-25 PROCEDURE — 73610 X-RAY EXAM OF ANKLE: CPT | Mod: RT
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
## 2019-10-21 ENCOUNTER — APPOINTMENT (OUTPATIENT)
Dept: ORTHOPEDIC SURGERY | Facility: CLINIC | Age: 49
End: 2019-10-21
Payer: MEDICAID

## 2019-10-21 VITALS
DIASTOLIC BLOOD PRESSURE: 108 MMHG | HEIGHT: 68 IN | HEART RATE: 75 BPM | SYSTOLIC BLOOD PRESSURE: 181 MMHG | BODY MASS INDEX: 26.52 KG/M2 | WEIGHT: 175 LBS

## 2019-10-21 DIAGNOSIS — S46.012A STRAIN OF MUSCLE(S) AND TENDON(S) OF THE ROTATOR CUFF OF LEFT SHOULDER, INITIAL ENCOUNTER: ICD-10-CM

## 2019-10-21 PROCEDURE — 99213 OFFICE O/P EST LOW 20 MIN: CPT

## 2019-10-21 NOTE — REASON FOR VISIT
[Follow-Up Visit] : a follow-up visit for [Initial Visit] : an initial visit for [Other: ____] : [unfilled] [FreeTextEntry2] : left shoulder pain

## 2019-10-21 NOTE — REVIEW OF SYSTEMS
[Joint Pain] : joint pain [Joint Swelling] : joint swelling [Fever] : no fever [Discharge] : discharge [Cough] : no cough [FreeTextEntry9] : Left wrist
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
## 2019-10-21 NOTE — PHYSICAL EXAM
[de-identified] : MRI to left shoulder reveals: Full-thickness near complete tear of the supraspinatus tendon. Mild to moderate tendinosis of the subscapularis tendon with minimal interstitial tearing at the cranial insertional fibers. Grade 2 sprain of the inferior capsular structures of the glenohumeral joint. Anterosuperior to anterior glenoid labrum appears frayed and irregular at its distal portion. There is a partial-thickness nondisplaced tear at the base of the superior labrum.  [de-identified] : Physical Exam:\par General: Well appearing, no acute distress\par Neurologic: A&Ox3, No focal deficits\par Head: NCAT without abrasions, lacerations, or ecchymosis to head, face, or scalp\par Eyes: No scleral icterus, no gross abnormalities\par Respiratory: Equal chest wall expansion bilaterally, no accessory muscle use\par Lymphatic: No lymphadenopathy palpated\par Skin: Warm and dry\par Psychiatric: Normal mood and affect\par \par Left Shoulder\par ·	Inspection/Palpation: no tenderness, swelling or deformities\par ·	Range of Motion: no crepitus with ROM; Active FF 90; ER at side 15; IR to belt; Passive ; ER at side 25; IR to belt\par ·	Strength: forward elevation in scapular plane [4/5], internal rotation [4/5], external rotation [4/5], adduction [4/5] and abduction [4/5]\par ·	Stability: no joint instability on provocative testing\par ·	Tests: Pérez test positive, Neer positive, positive drop arm test secondary to pain, bear hug test positive, Napolean sign negative, cross arm adduction negative, lift off sign positive, hornblowers sign negative, speeds test negative, Yergason's test negative, no bicipital groove tenderness, Scott's Active Compression test negative, whipple test positive, bicep's load II test negative\par \par Right Shoulder\par ·	Inspection/Palpation: no tenderness, swelling or deformities\par ·	Range of Motion: full and painless in all planes, no crepitus\par ·	Strength: forward elevation in scapular plane 5/5, internal rotation 5/5, external rotation 5/5, adduction 5/5 and abduction 5/5\par ·	Stability: no joint instability on provocative testing\par ·	Tests: Pérez test negative, Neer sign negative, negative drop arm test secondary to pain, bear hug test negative, Napolean sign negative, cross arm adduction negative, lift off sign positive, hornblowers sign negative, speeds test negative, Yergason's test negative, no bicipital groove tenderness, Scott's Active Compression test negative\par \par
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
## 2019-10-22 ENCOUNTER — APPOINTMENT (OUTPATIENT)
Dept: ORTHOPEDIC SURGERY | Facility: CLINIC | Age: 49
End: 2019-10-22

## 2019-11-18 ENCOUNTER — MESSAGE (OUTPATIENT)
Age: 49
End: 2019-11-18

## 2019-11-20 ENCOUNTER — APPOINTMENT (OUTPATIENT)
Dept: ORTHOPEDIC SURGERY | Facility: CLINIC | Age: 49
End: 2019-11-20
Payer: MEDICAID

## 2019-11-20 VITALS
HEART RATE: 98 BPM | BODY MASS INDEX: 26.52 KG/M2 | WEIGHT: 175 LBS | SYSTOLIC BLOOD PRESSURE: 144 MMHG | DIASTOLIC BLOOD PRESSURE: 93 MMHG | HEIGHT: 68 IN

## 2019-11-20 PROCEDURE — 99214 OFFICE O/P EST MOD 30 MIN: CPT

## 2019-12-04 ENCOUNTER — APPOINTMENT (OUTPATIENT)
Dept: ORTHOPEDIC SURGERY | Facility: HOSPITAL | Age: 49
End: 2019-12-04
Payer: MEDICAID

## 2019-12-04 PROCEDURE — ZZZZZ: CPT

## 2019-12-17 ENCOUNTER — APPOINTMENT (OUTPATIENT)
Dept: ORTHOPEDIC SURGERY | Facility: CLINIC | Age: 49
End: 2019-12-17

## 2020-02-20 ENCOUNTER — APPOINTMENT (OUTPATIENT)
Dept: ORTHOPEDIC SURGERY | Facility: CLINIC | Age: 50
End: 2020-02-20
Payer: MEDICAID

## 2020-02-20 VITALS
HEART RATE: 100 BPM | SYSTOLIC BLOOD PRESSURE: 151 MMHG | BODY MASS INDEX: 26.52 KG/M2 | WEIGHT: 175 LBS | HEIGHT: 68 IN | DIASTOLIC BLOOD PRESSURE: 94 MMHG

## 2020-02-20 DIAGNOSIS — S52.502D UNSPECIFIED FRACTURE OF THE LOWER END OF LEFT RADIUS, SUBSEQUENT ENCOUNTER FOR CLOSED FRACTURE WITH ROUTINE HEALING: ICD-10-CM

## 2020-02-20 DIAGNOSIS — S69.92XD UNSPECIFIED INJURY OF LEFT WRIST, HAND AND FINGER(S), SUBSEQUENT ENCOUNTER: ICD-10-CM

## 2020-02-20 DIAGNOSIS — S62.633D DISPLACED FRACTURE OF DISTAL PHALANX OF LEFT MIDDLE FINGER, SUBSEQUENT ENCOUNTER FOR FRACTURE WITH ROUTINE HEALING: ICD-10-CM

## 2020-02-20 PROCEDURE — 73140 X-RAY EXAM OF FINGER(S): CPT | Mod: F2

## 2020-02-20 PROCEDURE — 99214 OFFICE O/P EST MOD 30 MIN: CPT

## 2021-03-04 NOTE — ASU PREOP CHECKLIST - WARM FLUIDS/WARM BLANKETS
Fax received from pharmacy requesting prior authorization on One Touch Verio test strips. PA button initiated.      no

## 2022-06-16 NOTE — BRIEF OPERATIVE NOTE - COMMENTS
tourniquet time 143 min
post-op plan: NWB RLE, elevate RLE, PT/OT, Clinda for PCN allergy, DVT prophy per ACS, f/u ortho on combo Margarita/[place] day in 2-3 weeks
No

## 2022-12-08 NOTE — PATIENT PROFILE ADULT - BRADEN SENSORY
Pt discharged home with all belongings and all questions answered. IV's removed, education complete. Father here for transport home. (4) no impairment

## 2023-04-23 NOTE — OCCUPATIONAL THERAPY INITIAL EVALUATION ADULT - WEIGHT-BEARING RESTRICTIONS: STAND/SIT, REHAB EVAL
Pt BIBA found down on the floor, apneic and unresponsive. given 2 mg of intranasal Narcan on scene. Pt awake in triage. Pt takes morphine and oxycodone daily for knee pain.  as per EMS.
weight-bearing as tolerated/nonweight-bearing/WBAT using platform RW for left elbow

## 2023-11-03 NOTE — PHYSICAL EXAM
Patient: Behzad Peoples  : 1954    Primary Care Provider: Rigo Mustafa MD    Requesting Provider: As above        History    Chief Complaint: Low back pain.    History of Present Illness: Mr. Peoples is a 69-year-old retired gentleman who is known to our service.  On 3/19/2021 he underwent decompressive laminectomies at L3-4 and L4-5 by my former colleague Dr. Quiros.  His chronic back pain that has been there since the 80s improved a little bit but it has been ongoing.  Over the last 6 months he has had increasing pain in his left lower back.  This occurs with standing or walking for long periods of time or with twisting.  He does better sitting.  He has no leg pain.  He has not undergone any formal treatment.    Review of Systems   Constitutional:  Negative for activity change, appetite change, chills, diaphoresis, fatigue, fever and unexpected weight change.   HENT:  Negative for congestion, dental problem, drooling, ear discharge, ear pain, facial swelling, hearing loss, mouth sores, nosebleeds, postnasal drip, rhinorrhea, sinus pressure, sinus pain, sneezing, sore throat, tinnitus, trouble swallowing and voice change.    Eyes:  Negative for photophobia, pain, discharge, redness, itching and visual disturbance.   Respiratory:  Negative for apnea, cough, choking, chest tightness, shortness of breath, wheezing and stridor.    Cardiovascular:  Negative for chest pain, palpitations and leg swelling.   Gastrointestinal:  Negative for abdominal distention, abdominal pain, anal bleeding, blood in stool, constipation, diarrhea, nausea, rectal pain and vomiting.   Endocrine: Negative for cold intolerance, heat intolerance, polydipsia, polyphagia and polyuria.   Genitourinary:  Negative for decreased urine volume, difficulty urinating, dysuria, enuresis, flank pain, frequency, genital sores, hematuria, penile discharge, penile pain, penile swelling, scrotal swelling, testicular pain and urgency.  "  Musculoskeletal:  Positive for arthralgias and back pain. Negative for gait problem, joint swelling, myalgias, neck pain and neck stiffness.   Skin:  Negative for color change, pallor, rash and wound.   Allergic/Immunologic: Positive for environmental allergies. Negative for food allergies and immunocompromised state.   Neurological:  Positive for numbness. Negative for dizziness, tremors, seizures, syncope, facial asymmetry, speech difficulty, weakness, light-headedness and headaches.   Hematological:  Negative for adenopathy. Does not bruise/bleed easily.   Psychiatric/Behavioral:  Negative for agitation, behavioral problems, confusion, decreased concentration, dysphoric mood, hallucinations, self-injury, sleep disturbance and suicidal ideas. The patient is not nervous/anxious and is not hyperactive.        The patient's past medical history, past surgical history, family history, and social history have been reviewed at length in the electronic medical record.      Physical Exam:   Temp 97.5 °F (36.4 °C) (Infrared)   Ht 185.4 cm (73\")   Wt 112 kg (246 lb 9.6 oz)   BMI 32.53 kg/m²   CONSTITUTIONAL: Patient is well-nourished, pleasant and appears stated age.  MUSCULOSKELETAL:  Straight leg raising is negative.  Derek's Sign is negative.  ROM in the low back is normal.  Tenderness in the back to palpation is not observed.  Well-healed midline lumbar incision is noted.  NEUROLOGICAL:  Orientation, memory, attention span, language function, and cognition have been examined and are intact.  Strength is intact in the lower extremities to direct testing.  Muscle tone is normal throughout.  Station and gait are normal.  Sensation is intact to light touch testing throughout.  Deep tendon reflexes are 1+ and symmetrical.  Coordination is intact.      Medical Decision Making    Data Review:   (All imaging studies were personally reviewed unless stated otherwise)  MRI of the lumbar spine dated 10/6/2023 is compared to " study from 12/7/2020.  Laminectomies have been performed in between the studies.  Above studies is a very low-grade listhesis at L3-4 with small joint effusions.  On the new study there is a low-grade listhesis that was not there previously at L4-5.  There is mild to at best moderate narrowing at L2-3.    Diagnosis:   The patient has axial back pain with walking and standing intolerance.  This could be due to to stenosis or potentially instability.    Treatment Options:   I have referred the patient for physical therapy.  Prior to follow-up with me in several weeks I went to check flexion-extension upright lateral lumbar spine x-rays to assess for instability.      Scribed for Darrel Workman MD by Mile Trinh CMA on 11/3/2023 15:55 EDT       I, Dr. Workman, personally performed the services described in the documentation, as scribed in my presence, and it is both accurate and complete.   [de-identified] : Physical Exam:\par General: Well appearing, no acute distress, A&O\par Neurologic: A&Ox3, No focal deficits\par Head: NCAT without abrasions, lacerations, or ecchymosis to head, face, or scalp\par Respiratory: Equal chest wall expansion bilaterally, no accessory muscle use\par Lymphatic: No lymphadenopathy palpated\par Skin: Warm and dry\par Psychiatric: Normal mood and affect\par \par Lower Extremities:\par RIGHT LE: No deformities, abrasions, or other signs of trauma at hip, thigh, knee, leg, or foot.  Full ROM without pain at hip, knee, and toe with negative log-roll and Stinchfield tests. surgical incisions well-healed. slight decreased range of motion when compared to contralateral ankle\par \par RLE strength: 		Hip flexion		5/5\par 			Quads			5/5\par 			Hamstrings		5/5\par 			Tib Anterior		5/5\par 			Gastroc		5/5\par 			EHL			5/5\par 			FHL			5/5\par RLE sensation intact to sural/saphenous/sup peroneal/deep peroneal/post tib distributions\par 2+ DP/PT pulses with good cap refill distally\par \par LEFT LE: No deformities, abrasions, or other signs of trauma at hip, thigh, knee, leg, ankle or foot.  Full ROM without pain at hip, knee, ankle and toe with negative log-roll and Stinchfield tests.\par \par LLE strength: 		Hip flexion		5/5\par 			Quads			5/5\par 			Hamstrings		5/5\par 			Tib Anterior		5/5\par 			Gastroc		5/5\par 			EHL			5/5\par 			FHL			5/5\par LLE sensation intact to sural/saphenous/sup peroneal/deep peroneal/post tib distributions\par 2+ DP/PT pulses with good cap refill distally [de-identified] : Plain films of the right ankle were obtained today. They show a healing right bimalleolar ankle fracture with no loss of alignment

## 2024-06-07 NOTE — OCCUPATIONAL THERAPY INITIAL EVALUATION ADULT - SHARP/DULL DISCRIMINATION, LUE, REHAB EVAL
TRANSFER - OUT REPORT:    Verbal report given to Adriana on Rosaura Blanchard  being transferred to [facility] for routine post-op       Report consisted of patient’s Situation, Background, Assessment and   Recommendations(SBAR).     Information from the following report(s) Nurse Handoff Report, Surgery Report, Intake/Output, MAR, Recent Results, and Cardiac Rhythm sinus  was reviewed with the receiving nurse.    Lines:   Peripheral IV 06/05/24 Left;Posterior Hand (Active)   Site Assessment Clean, dry & intact 06/07/24 0125   Line Status Infusing 06/07/24 0125   Line Care Connections checked and tightened 06/07/24 0125   Phlebitis Assessment No symptoms 06/07/24 0125   Infiltration Assessment 0 06/07/24 0125   Alcohol Cap Used Yes 06/06/24 0730   Dressing Status Clean, dry & intact 06/07/24 0125   Dressing Type Transparent 06/07/24 0125       Peripheral IV 06/06/24 Right Wrist (Active)   Site Assessment Clean, dry & intact 06/07/24 0125   Line Status Capped;Flushed;Normal saline locked 06/07/24 0125   Line Care Connections checked and tightened 06/07/24 0125   Phlebitis Assessment No symptoms 06/07/24 0125   Infiltration Assessment 0 06/07/24 0125   Alcohol Cap Used Yes 06/06/24 1615   Dressing Status Clean, dry & intact 06/07/24 0125   Dressing Type Transparent 06/07/24 0125       Peripheral IV 06/07/24 Distal;Left;Posterior Forearm (Active)   Site Assessment Clean, dry & intact 06/07/24 0125   Line Status Infusing 06/07/24 0125   Line Care Connections checked and tightened 06/07/24 0125   Phlebitis Assessment No symptoms 06/07/24 0125   Infiltration Assessment 0 06/07/24 0125   Dressing Status Clean, dry & intact 06/07/24 0125   Dressing Type Transparent 06/07/24 0125        Opportunity for questions and clarification was provided.      Patient transported with:   Monitor  O2 @ 2 liters  Registered Nurse    VTE prophylaxis orders have been written for Rosaura Blanchard.    Patient and family given floor number and  Yes not tested